# Patient Record
Sex: MALE | Race: WHITE | NOT HISPANIC OR LATINO | URBAN - METROPOLITAN AREA
[De-identification: names, ages, dates, MRNs, and addresses within clinical notes are randomized per-mention and may not be internally consistent; named-entity substitution may affect disease eponyms.]

---

## 2017-01-05 ENCOUNTER — EMERGENCY (EMERGENCY)
Facility: HOSPITAL | Age: 82
LOS: 1 days | Discharge: HOME | End: 2017-01-05
Admitting: EMERGENCY MEDICINE
Payer: MEDICARE

## 2017-01-05 DIAGNOSIS — I25.10 ATHEROSCLEROTIC HEART DISEASE OF NATIVE CORONARY ARTERY WITHOUT ANGINA PECTORIS: ICD-10-CM

## 2017-01-05 DIAGNOSIS — R53.1 WEAKNESS: ICD-10-CM

## 2017-01-05 DIAGNOSIS — F02.81 DEMENTIA IN OTHER DISEASES CLASSIFIED ELSEWHERE, UNSPECIFIED SEVERITY, WITH BEHAVIORAL DISTURBANCE: ICD-10-CM

## 2017-01-05 DIAGNOSIS — Z98.62 PERIPHERAL VASCULAR ANGIOPLASTY STATUS: ICD-10-CM

## 2017-01-05 DIAGNOSIS — Z79.82 LONG TERM (CURRENT) USE OF ASPIRIN: ICD-10-CM

## 2017-01-05 DIAGNOSIS — G30.9 ALZHEIMER'S DISEASE, UNSPECIFIED: ICD-10-CM

## 2017-01-05 DIAGNOSIS — I10 ESSENTIAL (PRIMARY) HYPERTENSION: ICD-10-CM

## 2017-01-05 PROCEDURE — 93005 ELECTROCARDIOGRAM TRACING: CPT

## 2017-01-05 PROCEDURE — 71045 X-RAY EXAM CHEST 1 VIEW: CPT

## 2017-01-05 PROCEDURE — 85027 COMPLETE CBC AUTOMATED: CPT

## 2017-01-05 PROCEDURE — 99284 EMERGENCY DEPT VISIT MOD MDM: CPT | Mod: 25

## 2017-01-05 PROCEDURE — 93010 ELECTROCARDIOGRAM REPORT: CPT

## 2017-01-05 PROCEDURE — 36415 COLL VENOUS BLD VENIPUNCTURE: CPT

## 2017-01-05 PROCEDURE — 84484 ASSAY OF TROPONIN QUANT: CPT

## 2017-01-05 PROCEDURE — 80048 BASIC METABOLIC PNL TOTAL CA: CPT

## 2017-01-05 PROCEDURE — 85610 PROTHROMBIN TIME: CPT

## 2017-01-05 PROCEDURE — 70450 CT HEAD/BRAIN W/O DYE: CPT

## 2017-01-05 PROCEDURE — 85730 THROMBOPLASTIN TIME PARTIAL: CPT

## 2017-01-05 PROCEDURE — 99285 EMERGENCY DEPT VISIT HI MDM: CPT

## 2017-01-05 PROCEDURE — 81003 URINALYSIS AUTO W/O SCOPE: CPT

## 2017-01-05 PROCEDURE — 87086 URINE CULTURE/COLONY COUNT: CPT

## 2017-01-05 PROCEDURE — 71010: CPT | Mod: 26

## 2017-01-05 PROCEDURE — 70450 CT HEAD/BRAIN W/O DYE: CPT | Mod: 26

## 2017-08-01 ENCOUNTER — EMERGENCY (EMERGENCY)
Facility: HOSPITAL | Age: 82
LOS: 1 days | Discharge: HOME | End: 2017-08-01
Admitting: INTERNAL MEDICINE
Payer: MEDICARE

## 2017-08-01 DIAGNOSIS — I25.10 ATHEROSCLEROTIC HEART DISEASE OF NATIVE CORONARY ARTERY WITHOUT ANGINA PECTORIS: ICD-10-CM
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
## 2017-08-01 DIAGNOSIS — Z98.890 OTHER SPECIFIED POSTPROCEDURAL STATES: ICD-10-CM

## 2017-08-01 DIAGNOSIS — Z79.82 LONG TERM (CURRENT) USE OF ASPIRIN: ICD-10-CM
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
## 2017-08-01 DIAGNOSIS — S09.90XA UNSPECIFIED INJURY OF HEAD, INITIAL ENCOUNTER: ICD-10-CM

## 2017-08-01 DIAGNOSIS — I10 ESSENTIAL (PRIMARY) HYPERTENSION: ICD-10-CM

## 2017-08-01 DIAGNOSIS — Z23 ENCOUNTER FOR IMMUNIZATION: ICD-10-CM

## 2017-08-01 DIAGNOSIS — S01.81XA LACERATION WITHOUT FOREIGN BODY OF OTHER PART OF HEAD, INITIAL ENCOUNTER: ICD-10-CM
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
## 2017-08-01 DIAGNOSIS — Z79.2 LONG TERM (CURRENT) USE OF ANTIBIOTICS: ICD-10-CM

## 2017-08-01 DIAGNOSIS — F03.90 UNSPECIFIED DEMENTIA WITHOUT BEHAVIORAL DISTURBANCE: ICD-10-CM

## 2017-08-01 PROCEDURE — 90700 DTAP VACCINE < 7 YRS IM: CPT

## 2017-08-01 PROCEDURE — 12013 RPR F/E/E/N/L/M 2.6-5.0 CM: CPT

## 2017-08-01 PROCEDURE — 70450 CT HEAD/BRAIN W/O DYE: CPT | Mod: 26

## 2017-08-01 PROCEDURE — 99284 EMERGENCY DEPT VISIT MOD MDM: CPT | Mod: 25

## 2017-08-01 PROCEDURE — 70450 CT HEAD/BRAIN W/O DYE: CPT

## 2017-08-01 PROCEDURE — 90471 IMMUNIZATION ADMIN: CPT

## 2017-09-14 ENCOUNTER — EMERGENCY (EMERGENCY)
Facility: HOSPITAL | Age: 82
LOS: 1 days | Discharge: NURSING, REHAB OR LONG-TERM CARE | End: 2017-09-14
Attending: INTERNAL MEDICINE | Admitting: INTERNAL MEDICINE
Payer: MEDICARE

## 2017-09-14 VITALS
DIASTOLIC BLOOD PRESSURE: 65 MMHG | RESPIRATION RATE: 16 BRPM | OXYGEN SATURATION: 99 % | SYSTOLIC BLOOD PRESSURE: 133 MMHG | HEART RATE: 89 BPM | TEMPERATURE: 98 F

## 2017-09-14 VITALS
HEIGHT: 67 IN | HEART RATE: 78 BPM | TEMPERATURE: 98 F | OXYGEN SATURATION: 96 % | DIASTOLIC BLOOD PRESSURE: 60 MMHG | SYSTOLIC BLOOD PRESSURE: 139 MMHG | WEIGHT: 154.98 LBS | RESPIRATION RATE: 20 BRPM

## 2017-09-14 LAB
ALBUMIN SERPL ELPH-MCNC: 3.2 G/DL — LOW (ref 3.3–5)
ALP SERPL-CCNC: 103 U/L — SIGNIFICANT CHANGE UP (ref 30–120)
ALT FLD-CCNC: 26 U/L DA — SIGNIFICANT CHANGE UP (ref 10–60)
ANION GAP SERPL CALC-SCNC: 11 MMOL/L — SIGNIFICANT CHANGE UP (ref 5–17)
APTT BLD: 31.6 SEC — SIGNIFICANT CHANGE UP (ref 27.5–37.4)
AST SERPL-CCNC: 21 U/L — SIGNIFICANT CHANGE UP (ref 10–40)
BASOPHILS # BLD AUTO: 0 K/UL — SIGNIFICANT CHANGE UP (ref 0–0.2)
BASOPHILS NFR BLD AUTO: 0.4 % — SIGNIFICANT CHANGE UP (ref 0–2)
BILIRUB SERPL-MCNC: 0.5 MG/DL — SIGNIFICANT CHANGE UP (ref 0.2–1.2)
BUN SERPL-MCNC: 30 MG/DL — HIGH (ref 7–23)
CALCIUM SERPL-MCNC: 8.8 MG/DL — SIGNIFICANT CHANGE UP (ref 8.4–10.5)
CHLORIDE SERPL-SCNC: 104 MMOL/L — SIGNIFICANT CHANGE UP (ref 96–108)
CK MB BLD-MCNC: 1.4 % — SIGNIFICANT CHANGE UP (ref 0–3.5)
CK MB CFR SERPL CALC: 2.7 NG/ML — SIGNIFICANT CHANGE UP (ref 0–3.6)
CK SERPL-CCNC: 191 U/L — SIGNIFICANT CHANGE UP (ref 39–308)
CO2 SERPL-SCNC: 28 MMOL/L — SIGNIFICANT CHANGE UP (ref 22–31)
CREAT SERPL-MCNC: 1.35 MG/DL — HIGH (ref 0.5–1.3)
EOSINOPHIL # BLD AUTO: 0.1 K/UL — SIGNIFICANT CHANGE UP (ref 0–0.5)
EOSINOPHIL NFR BLD AUTO: 0.6 % — SIGNIFICANT CHANGE UP (ref 0–6)
GLUCOSE SERPL-MCNC: 110 MG/DL — HIGH (ref 70–99)
HCT VFR BLD CALC: 32.3 % — LOW (ref 39–50)
HGB BLD-MCNC: 9.8 G/DL — LOW (ref 13–17)
INR BLD: 1.05 RATIO — SIGNIFICANT CHANGE UP (ref 0.88–1.16)
LYMPHOCYTES # BLD AUTO: 1.1 K/UL — SIGNIFICANT CHANGE UP (ref 1–3.3)
LYMPHOCYTES # BLD AUTO: 11.8 % — LOW (ref 13–44)
MCHC RBC-ENTMCNC: 27.8 PG — SIGNIFICANT CHANGE UP (ref 27–34)
MCHC RBC-ENTMCNC: 30.3 GM/DL — LOW (ref 32–36)
MCV RBC AUTO: 92 FL — SIGNIFICANT CHANGE UP (ref 80–100)
MONOCYTES # BLD AUTO: 0.9 K/UL — SIGNIFICANT CHANGE UP (ref 0–0.9)
MONOCYTES NFR BLD AUTO: 9.6 % — SIGNIFICANT CHANGE UP (ref 2–14)
NEUTROPHILS # BLD AUTO: 7.4 K/UL — SIGNIFICANT CHANGE UP (ref 1.8–7.4)
NEUTROPHILS NFR BLD AUTO: 77.5 % — HIGH (ref 43–77)
PLATELET # BLD AUTO: 214 K/UL — SIGNIFICANT CHANGE UP (ref 150–400)
POTASSIUM SERPL-MCNC: 3.9 MMOL/L — SIGNIFICANT CHANGE UP (ref 3.5–5.3)
POTASSIUM SERPL-SCNC: 3.9 MMOL/L — SIGNIFICANT CHANGE UP (ref 3.5–5.3)
PROT SERPL-MCNC: 7.1 G/DL — SIGNIFICANT CHANGE UP (ref 6–8.3)
PROTHROM AB SERPL-ACNC: 11.5 SEC — SIGNIFICANT CHANGE UP (ref 9.8–12.7)
RBC # BLD: 3.51 M/UL — LOW (ref 4.2–5.8)
RBC # FLD: 15.8 % — HIGH (ref 10.3–14.5)
SODIUM SERPL-SCNC: 143 MMOL/L — SIGNIFICANT CHANGE UP (ref 135–145)
TROPONIN I SERPL-MCNC: 0 NG/ML — LOW (ref 0.02–0.06)
WBC # BLD: 9.5 K/UL — SIGNIFICANT CHANGE UP (ref 3.8–10.5)
WBC # FLD AUTO: 9.5 K/UL — SIGNIFICANT CHANGE UP (ref 3.8–10.5)

## 2017-09-14 PROCEDURE — 74020: CPT

## 2017-09-14 PROCEDURE — 74020: CPT | Mod: 26

## 2017-09-14 PROCEDURE — 80053 COMPREHEN METABOLIC PANEL: CPT

## 2017-09-14 PROCEDURE — 84484 ASSAY OF TROPONIN QUANT: CPT

## 2017-09-14 PROCEDURE — 85027 COMPLETE CBC AUTOMATED: CPT

## 2017-09-14 PROCEDURE — 71045 X-RAY EXAM CHEST 1 VIEW: CPT

## 2017-09-14 PROCEDURE — 85610 PROTHROMBIN TIME: CPT

## 2017-09-14 PROCEDURE — 99284 EMERGENCY DEPT VISIT MOD MDM: CPT

## 2017-09-14 PROCEDURE — 82550 ASSAY OF CK (CPK): CPT

## 2017-09-14 PROCEDURE — 71010: CPT | Mod: 26

## 2017-09-14 PROCEDURE — 85730 THROMBOPLASTIN TIME PARTIAL: CPT

## 2017-09-14 PROCEDURE — 99284 EMERGENCY DEPT VISIT MOD MDM: CPT | Mod: 25

## 2017-09-14 PROCEDURE — 82553 CREATINE MB FRACTION: CPT

## 2017-09-14 PROCEDURE — 93010 ELECTROCARDIOGRAM REPORT: CPT

## 2017-09-14 PROCEDURE — 93005 ELECTROCARDIOGRAM TRACING: CPT

## 2017-09-14 NOTE — ED ADULT NURSE NOTE - OBJECTIVE STATEMENT
As per EMS patient was bradycardic on transport. Rhythm strip provided to ED MD reads HR 54 and 62. Medicated by [facility], RSR. According to transfer paper from facility patient has Right Ingular hernia that is strangulated. According to patient's aide that accompanies patient, patient was sent here because he chocked on food while eating. Dr Delacruz aware. CM in place RSR high 70's low 80's.

## 2017-09-14 NOTE — ED PROVIDER NOTE - SKIN, MLM
Skin normal color for race, warm, dry and intact. No evidence of rash. bruise overlying left lower anterior ribcage. stasis changes both lower extremities.

## 2017-09-14 NOTE — ED PROVIDER NOTE - ENMT, MLM
Dry mucous membranes. Airway patent, Throat has no vesicles, no oropharyngeal exudates and uvula is midline.

## 2017-09-14 NOTE — ED PROVIDER NOTE - PHYSICAL EXAMINATION
left subconjunctival hemorrhage. DMM. distended abdomen, but soft and non-tender. right inguinal hernia scar. bruise overlying left lower anterior ribcage. stasis changes both lower extremities. varicosities both lower extremities especially left medial thigh left subconjunctival hemorrhage.dry mucus memb,. distended abdomen, but soft and non-tender. right inguinal hernia scar but no incarc hernia. bruise overlying left lower anterior ribcage. stasis changes both lower extremities. varicosities both lower extremities, especially left medial thigh

## 2017-09-14 NOTE — ED PROVIDER NOTE - MEDICAL DECISION MAKING DETAILS
unclear at first as to why pt is in er...ambulance rxd pt for baradycardia with atropine but  ekg and monitor don't show this..nh note says aidee ward but he has none..aide says he choked on food...cxr without infilt...lungs clear...pt not coughing at all...ekg without bradycardia and trop nl.will rt nh.

## 2017-09-14 NOTE — ED PROVIDER NOTE - CARDIAC, MLM
Normal rate, regular rhythm.  Heart sounds S1, S2.  No murmurs, rubs or gallops. varicosities both lower extremities especially left medial thigh

## 2017-09-14 NOTE — ED ADULT NURSE REASSESSMENT NOTE - NS ED NURSE REASSESS COMMENT FT1
Patient picked up by [facility], caregiver at his side.
DC home awaiting [facility].
Patient awaiting lab results. As per aide the patient sneaked into the cafeteria and took food he is not supposed to eat and chocked to the pooint he turned blue. That is the reason he was sent to ER. VSS

## 2017-09-14 NOTE — ED PROVIDER NOTE - NS_ ATTENDINGSCRIBEDETAILS _ED_A_ED_FT
Saji Delacruz MD - The scribe's documentation has been prepared under my direction and personally reviewed by me in its entirety. I confirm that the note above accurately reflects all work, treatment, procedures, and medical decision making performed by me.

## 2017-09-14 NOTE — ED PROVIDER NOTE - OBJECTIVE STATEMENT
89 y/o M pt with history of CAD presents to the ED by ambulance with bradycardia and incarcerated hernia as per EMS. EMS gave him .5 Atropine x2 in the ambulance. Denies chest pain, shortness of breath, pain. No further complaints at this time. Pt is a poor historian. 89 y/o M from [facility] pt with history of CAD presents to the ED by ambulance with bradycardia rxd with atropine and incarcerated hernia as per nh note. pt Denies chest pain, shortness of breath, pain but there is h/o dementia No further complaints at this time.aide says pt is in er because he choked on food...(in er without either incarc hernia or bradycardia).

## 2017-09-21 ENCOUNTER — EMERGENCY (EMERGENCY)
Facility: HOSPITAL | Age: 82
LOS: 1 days | Discharge: HOME | End: 2017-09-21
Attending: EMERGENCY MEDICINE
Payer: MEDICARE

## 2017-09-21 VITALS
HEART RATE: 67 BPM | SYSTOLIC BLOOD PRESSURE: 133 MMHG | OXYGEN SATURATION: 96 % | TEMPERATURE: 98 F | HEIGHT: 67 IN | DIASTOLIC BLOOD PRESSURE: 60 MMHG | RESPIRATION RATE: 16 BRPM | WEIGHT: 154.98 LBS

## 2017-09-21 VITALS
HEART RATE: 55 BPM | TEMPERATURE: 98 F | RESPIRATION RATE: 16 BRPM | SYSTOLIC BLOOD PRESSURE: 160 MMHG | OXYGEN SATURATION: 99 % | DIASTOLIC BLOOD PRESSURE: 52 MMHG

## 2017-09-21 PROCEDURE — 99282 EMERGENCY DEPT VISIT SF MDM: CPT

## 2017-09-21 NOTE — ED PROVIDER NOTE - OBJECTIVE STATEMENT
Patient was walking at his residence, and a vein on the side of his foot "burst" and started bleeding, according to his aide. Has happened before. No injury known. No other c/o

## 2017-09-21 NOTE — ED ADULT NURSE NOTE - OBJECTIVE STATEMENT
Resident was eating dinner when staff at [facility] noticed bleeding from his left foot. Pt without any signs of pain, was ambulatory. No obvious injury at site, but pt has a varicosity which is source of bleeding. +1 edema bilaterally feet and ankles, +PP, skin is warm. Staff remains with pt.

## 2017-09-24 ENCOUNTER — EMERGENCY (EMERGENCY)
Facility: HOSPITAL | Age: 82
LOS: 1 days | Discharge: HOME | End: 2017-09-24
Admitting: INTERNAL MEDICINE
Payer: MEDICARE

## 2017-09-24 DIAGNOSIS — F03.90 UNSPECIFIED DEMENTIA WITHOUT BEHAVIORAL DISTURBANCE: ICD-10-CM

## 2017-09-24 DIAGNOSIS — I25.10 ATHEROSCLEROTIC HEART DISEASE OF NATIVE CORONARY ARTERY WITHOUT ANGINA PECTORIS: ICD-10-CM

## 2017-09-24 DIAGNOSIS — I83.892 VARICOSE VEINS OF LEFT LOWER EXTREMITY WITH OTHER COMPLICATIONS: ICD-10-CM

## 2017-09-24 DIAGNOSIS — Z78.9 OTHER SPECIFIED HEALTH STATUS: ICD-10-CM

## 2017-09-24 DIAGNOSIS — Z79.2 LONG TERM (CURRENT) USE OF ANTIBIOTICS: ICD-10-CM

## 2017-09-24 DIAGNOSIS — Z79.82 LONG TERM (CURRENT) USE OF ASPIRIN: ICD-10-CM

## 2017-09-24 DIAGNOSIS — I10 ESSENTIAL (PRIMARY) HYPERTENSION: ICD-10-CM

## 2017-09-24 DIAGNOSIS — Z98.62 PERIPHERAL VASCULAR ANGIOPLASTY STATUS: ICD-10-CM

## 2017-09-24 DIAGNOSIS — Z79.899 OTHER LONG TERM (CURRENT) DRUG THERAPY: ICD-10-CM

## 2017-09-24 PROCEDURE — 99283 EMERGENCY DEPT VISIT LOW MDM: CPT

## 2017-11-03 ENCOUNTER — EMERGENCY (EMERGENCY)
Facility: HOSPITAL | Age: 82
LOS: 1 days | Discharge: NURSING, REHAB OR LONG-TERM CARE | End: 2017-11-03
Attending: EMERGENCY MEDICINE | Admitting: EMERGENCY MEDICINE
Payer: MEDICARE

## 2017-11-03 VITALS
TEMPERATURE: 98 F | HEART RATE: 56 BPM | SYSTOLIC BLOOD PRESSURE: 138 MMHG | WEIGHT: 154.98 LBS | DIASTOLIC BLOOD PRESSURE: 46 MMHG | HEIGHT: 65 IN | RESPIRATION RATE: 18 BRPM | OXYGEN SATURATION: 100 %

## 2017-11-03 VITALS
DIASTOLIC BLOOD PRESSURE: 60 MMHG | OXYGEN SATURATION: 100 % | SYSTOLIC BLOOD PRESSURE: 164 MMHG | HEART RATE: 57 BPM | TEMPERATURE: 97 F | RESPIRATION RATE: 11 BRPM

## 2017-11-03 LAB
ALBUMIN SERPL ELPH-MCNC: 3.2 G/DL — LOW (ref 3.3–5)
ALP SERPL-CCNC: 107 U/L — SIGNIFICANT CHANGE UP (ref 30–120)
ALT FLD-CCNC: 23 U/L DA — SIGNIFICANT CHANGE UP (ref 10–60)
ANION GAP SERPL CALC-SCNC: 9 MMOL/L — SIGNIFICANT CHANGE UP (ref 5–17)
APTT BLD: 32.7 SEC — SIGNIFICANT CHANGE UP (ref 27.5–37.4)
AST SERPL-CCNC: 18 U/L — SIGNIFICANT CHANGE UP (ref 10–40)
BASOPHILS # BLD AUTO: 0 K/UL — SIGNIFICANT CHANGE UP (ref 0–0.2)
BASOPHILS NFR BLD AUTO: 0.5 % — SIGNIFICANT CHANGE UP (ref 0–2)
BILIRUB SERPL-MCNC: 0.5 MG/DL — SIGNIFICANT CHANGE UP (ref 0.2–1.2)
BUN SERPL-MCNC: 32 MG/DL — HIGH (ref 7–23)
CALCIUM SERPL-MCNC: 8.5 MG/DL — SIGNIFICANT CHANGE UP (ref 8.4–10.5)
CHLORIDE SERPL-SCNC: 104 MMOL/L — SIGNIFICANT CHANGE UP (ref 96–108)
CK MB BLD-MCNC: 2.1 % — SIGNIFICANT CHANGE UP (ref 0–3.5)
CK MB CFR SERPL CALC: 2.5 NG/ML — SIGNIFICANT CHANGE UP (ref 0–3.6)
CK SERPL-CCNC: 117 U/L — SIGNIFICANT CHANGE UP (ref 39–308)
CO2 SERPL-SCNC: 27 MMOL/L — SIGNIFICANT CHANGE UP (ref 22–31)
CREAT SERPL-MCNC: 1.39 MG/DL — HIGH (ref 0.5–1.3)
EOSINOPHIL # BLD AUTO: 0 K/UL — SIGNIFICANT CHANGE UP (ref 0–0.5)
EOSINOPHIL NFR BLD AUTO: 0.1 % — SIGNIFICANT CHANGE UP (ref 0–6)
GLUCOSE SERPL-MCNC: 87 MG/DL — SIGNIFICANT CHANGE UP (ref 70–99)
HCT VFR BLD CALC: 29.8 % — LOW (ref 39–50)
HGB BLD-MCNC: 8.9 G/DL — LOW (ref 13–17)
INR BLD: 1.1 RATIO — SIGNIFICANT CHANGE UP (ref 0.88–1.16)
LYMPHOCYTES # BLD AUTO: 1.1 K/UL — SIGNIFICANT CHANGE UP (ref 1–3.3)
LYMPHOCYTES # BLD AUTO: 11.4 % — LOW (ref 13–44)
MCHC RBC-ENTMCNC: 27.3 PG — SIGNIFICANT CHANGE UP (ref 27–34)
MCHC RBC-ENTMCNC: 29.8 GM/DL — LOW (ref 32–36)
MCV RBC AUTO: 91.6 FL — SIGNIFICANT CHANGE UP (ref 80–100)
MONOCYTES # BLD AUTO: 0.8 K/UL — SIGNIFICANT CHANGE UP (ref 0–0.9)
MONOCYTES NFR BLD AUTO: 8.7 % — SIGNIFICANT CHANGE UP (ref 2–14)
NEUTROPHILS # BLD AUTO: 7.6 K/UL — HIGH (ref 1.8–7.4)
NEUTROPHILS NFR BLD AUTO: 79.3 % — HIGH (ref 43–77)
PLATELET # BLD AUTO: 238 K/UL — SIGNIFICANT CHANGE UP (ref 150–400)
POTASSIUM SERPL-MCNC: 4 MMOL/L — SIGNIFICANT CHANGE UP (ref 3.5–5.3)
POTASSIUM SERPL-SCNC: 4 MMOL/L — SIGNIFICANT CHANGE UP (ref 3.5–5.3)
PROT SERPL-MCNC: 7.1 G/DL — SIGNIFICANT CHANGE UP (ref 6–8.3)
PROTHROM AB SERPL-ACNC: 12 SEC — SIGNIFICANT CHANGE UP (ref 9.8–12.7)
RBC # BLD: 3.25 M/UL — LOW (ref 4.2–5.8)
RBC # FLD: 16.8 % — HIGH (ref 10.3–14.5)
SODIUM SERPL-SCNC: 140 MMOL/L — SIGNIFICANT CHANGE UP (ref 135–145)
TROPONIN I SERPL-MCNC: 0 NG/ML — LOW (ref 0.02–0.06)
WBC # BLD: 9.5 K/UL — SIGNIFICANT CHANGE UP (ref 3.8–10.5)
WBC # FLD AUTO: 9.5 K/UL — SIGNIFICANT CHANGE UP (ref 3.8–10.5)

## 2017-11-03 PROCEDURE — 85610 PROTHROMBIN TIME: CPT

## 2017-11-03 PROCEDURE — 85027 COMPLETE CBC AUTOMATED: CPT

## 2017-11-03 PROCEDURE — 93010 ELECTROCARDIOGRAM REPORT: CPT

## 2017-11-03 PROCEDURE — 71010: CPT | Mod: 26

## 2017-11-03 PROCEDURE — 82550 ASSAY OF CK (CPK): CPT

## 2017-11-03 PROCEDURE — 93005 ELECTROCARDIOGRAM TRACING: CPT

## 2017-11-03 PROCEDURE — 85730 THROMBOPLASTIN TIME PARTIAL: CPT

## 2017-11-03 PROCEDURE — 84484 ASSAY OF TROPONIN QUANT: CPT

## 2017-11-03 PROCEDURE — 70450 CT HEAD/BRAIN W/O DYE: CPT | Mod: 26

## 2017-11-03 PROCEDURE — 71045 X-RAY EXAM CHEST 1 VIEW: CPT

## 2017-11-03 PROCEDURE — 80053 COMPREHEN METABOLIC PANEL: CPT

## 2017-11-03 PROCEDURE — 82553 CREATINE MB FRACTION: CPT

## 2017-11-03 PROCEDURE — 99284 EMERGENCY DEPT VISIT MOD MDM: CPT

## 2017-11-03 PROCEDURE — 70450 CT HEAD/BRAIN W/O DYE: CPT

## 2017-11-03 PROCEDURE — 99284 EMERGENCY DEPT VISIT MOD MDM: CPT | Mod: 25

## 2017-11-03 RX ORDER — MUPIROCIN 20 MG/G
0 OINTMENT TOPICAL
Qty: 0 | Refills: 0 | COMMUNITY

## 2017-11-03 RX ORDER — SODIUM CHLORIDE 9 MG/ML
3 INJECTION INTRAMUSCULAR; INTRAVENOUS; SUBCUTANEOUS ONCE
Qty: 0 | Refills: 0 | Status: DISCONTINUED | OUTPATIENT
Start: 2017-11-03 | End: 2017-11-11

## 2017-11-03 NOTE — ED PROVIDER NOTE - OBJECTIVE STATEMENT
89 y/o M w/ PMHx CAD, depression, dizziness, HTN, HLD, dementia presents to the ED from [facility] s/p syncopal episode, according to [facility] paperwork. Pt is on Lasix. Pt's hx is limited due to dementia.

## 2017-11-03 NOTE — ED ADULT NURSE NOTE - PMH
CAD (coronary artery disease)    Dementia    Depression    Dizziness    Hyperlipidemia    Hypertension

## 2018-03-28 ENCOUNTER — EMERGENCY (EMERGENCY)
Facility: HOSPITAL | Age: 83
LOS: 1 days | Discharge: HOME | End: 2018-03-28
Admitting: EMERGENCY MEDICINE
Payer: MEDICARE

## 2018-03-28 DIAGNOSIS — I83.892 VARICOSE VEINS OF LEFT LOWER EXTREMITY WITH OTHER COMPLICATIONS: ICD-10-CM

## 2018-03-28 DIAGNOSIS — Z98.62 PERIPHERAL VASCULAR ANGIOPLASTY STATUS: ICD-10-CM

## 2018-03-28 DIAGNOSIS — Z79.82 LONG TERM (CURRENT) USE OF ASPIRIN: ICD-10-CM

## 2018-03-28 DIAGNOSIS — R41.0 DISORIENTATION, UNSPECIFIED: ICD-10-CM

## 2018-03-28 DIAGNOSIS — I10 ESSENTIAL (PRIMARY) HYPERTENSION: ICD-10-CM

## 2018-03-28 DIAGNOSIS — F03.90 UNSPECIFIED DEMENTIA WITHOUT BEHAVIORAL DISTURBANCE: ICD-10-CM

## 2018-03-28 DIAGNOSIS — Z87.19 PERSONAL HISTORY OF OTHER DISEASES OF THE DIGESTIVE SYSTEM: ICD-10-CM

## 2018-03-28 DIAGNOSIS — I25.10 ATHEROSCLEROTIC HEART DISEASE OF NATIVE CORONARY ARTERY WITHOUT ANGINA PECTORIS: ICD-10-CM

## 2018-03-28 DIAGNOSIS — Z79.899 OTHER LONG TERM (CURRENT) DRUG THERAPY: ICD-10-CM

## 2018-03-28 PROCEDURE — 99283 EMERGENCY DEPT VISIT LOW MDM: CPT

## 2018-03-29 PROCEDURE — 99283 EMERGENCY DEPT VISIT LOW MDM: CPT

## 2018-05-23 ENCOUNTER — EMERGENCY (EMERGENCY)
Facility: HOSPITAL | Age: 83
LOS: 1 days | Discharge: HOME | End: 2018-05-23
Admitting: EMERGENCY MEDICINE
Payer: MEDICARE

## 2018-05-23 DIAGNOSIS — Z79.2 LONG TERM (CURRENT) USE OF ANTIBIOTICS: ICD-10-CM

## 2018-05-23 DIAGNOSIS — Z87.19 PERSONAL HISTORY OF OTHER DISEASES OF THE DIGESTIVE SYSTEM: ICD-10-CM

## 2018-05-23 DIAGNOSIS — Z98.62 PERIPHERAL VASCULAR ANGIOPLASTY STATUS: ICD-10-CM

## 2018-05-23 DIAGNOSIS — R09.89 OTHER SPECIFIED SYMPTOMS AND SIGNS INVOLVING THE CIRCULATORY AND RESPIRATORY SYSTEMS: ICD-10-CM

## 2018-05-23 DIAGNOSIS — R53.83 OTHER FATIGUE: ICD-10-CM

## 2018-05-23 DIAGNOSIS — Z79.899 OTHER LONG TERM (CURRENT) DRUG THERAPY: ICD-10-CM

## 2018-05-23 DIAGNOSIS — R11.10 VOMITING, UNSPECIFIED: ICD-10-CM

## 2018-05-23 DIAGNOSIS — F03.90 UNSPECIFIED DEMENTIA WITHOUT BEHAVIORAL DISTURBANCE: ICD-10-CM

## 2018-05-23 DIAGNOSIS — I25.10 ATHEROSCLEROTIC HEART DISEASE OF NATIVE CORONARY ARTERY WITHOUT ANGINA PECTORIS: ICD-10-CM

## 2018-05-23 DIAGNOSIS — Z79.82 LONG TERM (CURRENT) USE OF ASPIRIN: ICD-10-CM

## 2018-05-23 DIAGNOSIS — R55 SYNCOPE AND COLLAPSE: ICD-10-CM

## 2018-05-23 PROCEDURE — 99284 EMERGENCY DEPT VISIT MOD MDM: CPT | Mod: 25

## 2018-05-23 PROCEDURE — 82553 CREATINE MB FRACTION: CPT

## 2018-05-23 PROCEDURE — 82550 ASSAY OF CK (CPK): CPT

## 2018-05-23 PROCEDURE — 81003 URINALYSIS AUTO W/O SCOPE: CPT

## 2018-05-23 PROCEDURE — 85610 PROTHROMBIN TIME: CPT

## 2018-05-23 PROCEDURE — 84484 ASSAY OF TROPONIN QUANT: CPT

## 2018-05-23 PROCEDURE — 93010 ELECTROCARDIOGRAM REPORT: CPT

## 2018-05-23 PROCEDURE — 71045 X-RAY EXAM CHEST 1 VIEW: CPT | Mod: 26

## 2018-05-23 PROCEDURE — 96374 THER/PROPH/DIAG INJ IV PUSH: CPT

## 2018-05-23 PROCEDURE — 70450 CT HEAD/BRAIN W/O DYE: CPT

## 2018-05-23 PROCEDURE — 93005 ELECTROCARDIOGRAM TRACING: CPT

## 2018-05-23 PROCEDURE — 99285 EMERGENCY DEPT VISIT HI MDM: CPT

## 2018-05-23 PROCEDURE — 71045 X-RAY EXAM CHEST 1 VIEW: CPT

## 2018-05-23 PROCEDURE — 70450 CT HEAD/BRAIN W/O DYE: CPT | Mod: 26

## 2018-05-23 PROCEDURE — 85027 COMPLETE CBC AUTOMATED: CPT

## 2018-05-23 PROCEDURE — 85730 THROMBOPLASTIN TIME PARTIAL: CPT

## 2018-05-23 PROCEDURE — 80053 COMPREHEN METABOLIC PANEL: CPT

## 2018-06-25 ENCOUNTER — EMERGENCY (EMERGENCY)
Facility: HOSPITAL | Age: 83
LOS: 1 days | Discharge: HOME | End: 2018-06-25
Attending: EMERGENCY MEDICINE | Admitting: EMERGENCY MEDICINE
Payer: MEDICARE

## 2018-06-25 VITALS
HEART RATE: 62 BPM | OXYGEN SATURATION: 95 % | WEIGHT: 119.93 LBS | TEMPERATURE: 97 F | DIASTOLIC BLOOD PRESSURE: 66 MMHG | RESPIRATION RATE: 16 BRPM | SYSTOLIC BLOOD PRESSURE: 125 MMHG

## 2018-06-25 DIAGNOSIS — R58 HEMORRHAGE, NOT ELSEWHERE CLASSIFIED: ICD-10-CM

## 2018-06-25 PROCEDURE — 99283 EMERGENCY DEPT VISIT LOW MDM: CPT

## 2018-06-25 PROCEDURE — 99282 EMERGENCY DEPT VISIT SF MDM: CPT

## 2018-06-25 NOTE — ED ADULT NURSE NOTE - OBJECTIVE STATEMENT
Pt presents with gauze dressing on the left anterior foot. There is small amount of bleeding on dressing and very slight oozing from puncture on left foot. Pt is not oriented at baseline. Pt denies pain and does not grimace when moving extremities or palpation foot, head or neck. Pedal pulse is strong and present bilaterally. EMS denies report of pt falling. Pt not found on floor. Per EMS pt found sitting in chair.

## 2018-06-25 NOTE — ED PROVIDER NOTE - SKIN WOUND TYPE
L foot c varicosities. punctate site of bleeding identified anterior /inferior to L lateral malleolus over a varicosity. nontender. initially no active bleeding. after some irrigation c NS and manipulation, small amt bleeding

## 2018-06-25 NOTE — ED PROVIDER NOTE - OBJECTIVE STATEMENT
pt sent from assisted living for bleeding, moderate from left foot. no known injury. pt with dementia. unable to give history. denies any pain.

## 2018-06-25 NOTE — ED PROVIDER NOTE - MUSCULOSKELETAL, MLM
Spine appears normal, range of motion is not limited, no muscle or joint tenderness.  L foot/ankle nontender, FROM. 2+ DP pulse

## 2018-06-25 NOTE — ED ADULT TRIAGE NOTE - CHIEF COMPLAINT QUOTE
Pt RUDDY EMS from [facility] c/o left foot lac s/p scrapping foot on wooden bed post sometime before 0500 this AM.

## 2018-11-14 ENCOUNTER — INPATIENT (INPATIENT)
Facility: HOSPITAL | Age: 83
LOS: 11 days | Discharge: NURSING, REHAB OR LONG-TERM CARE | DRG: 690 | End: 2018-11-26
Attending: INTERNAL MEDICINE | Admitting: INTERNAL MEDICINE
Payer: MEDICARE

## 2018-11-14 VITALS
OXYGEN SATURATION: 98 % | HEIGHT: 68 IN | RESPIRATION RATE: 20 BRPM | HEART RATE: 64 BPM | TEMPERATURE: 98 F | SYSTOLIC BLOOD PRESSURE: 165 MMHG | WEIGHT: 169.98 LBS | DIASTOLIC BLOOD PRESSURE: 55 MMHG

## 2018-11-14 DIAGNOSIS — N39.0 URINARY TRACT INFECTION, SITE NOT SPECIFIED: ICD-10-CM

## 2018-11-14 DIAGNOSIS — R41.82 ALTERED MENTAL STATUS, UNSPECIFIED: ICD-10-CM

## 2018-11-14 DIAGNOSIS — R55 SYNCOPE AND COLLAPSE: ICD-10-CM

## 2018-11-14 DIAGNOSIS — G30.9 ALZHEIMER'S DISEASE, UNSPECIFIED: ICD-10-CM

## 2018-11-14 DIAGNOSIS — Z29.9 ENCOUNTER FOR PROPHYLACTIC MEASURES, UNSPECIFIED: ICD-10-CM

## 2018-11-14 DIAGNOSIS — I25.10 ATHEROSCLEROTIC HEART DISEASE OF NATIVE CORONARY ARTERY WITHOUT ANGINA PECTORIS: ICD-10-CM

## 2018-11-14 PROBLEM — F03.90 UNSPECIFIED DEMENTIA WITHOUT BEHAVIORAL DISTURBANCE: Chronic | Status: ACTIVE | Noted: 2017-11-03

## 2018-11-14 LAB
ALBUMIN SERPL ELPH-MCNC: 3.4 G/DL — SIGNIFICANT CHANGE UP (ref 3.3–5)
ALP SERPL-CCNC: 88 U/L — SIGNIFICANT CHANGE UP (ref 30–120)
ALT FLD-CCNC: 24 U/L DA — SIGNIFICANT CHANGE UP (ref 10–60)
ANION GAP SERPL CALC-SCNC: 10 MMOL/L — SIGNIFICANT CHANGE UP (ref 5–17)
APPEARANCE UR: CLEAR — SIGNIFICANT CHANGE UP
APTT BLD: 31.7 SEC — SIGNIFICANT CHANGE UP (ref 28.5–37)
AST SERPL-CCNC: 23 U/L — SIGNIFICANT CHANGE UP (ref 10–40)
BASOPHILS # BLD AUTO: 0.02 K/UL — SIGNIFICANT CHANGE UP (ref 0–0.2)
BASOPHILS NFR BLD AUTO: 0.2 % — SIGNIFICANT CHANGE UP (ref 0–2)
BILIRUB SERPL-MCNC: 0.4 MG/DL — SIGNIFICANT CHANGE UP (ref 0.2–1.2)
BILIRUB UR-MCNC: NEGATIVE — SIGNIFICANT CHANGE UP
BUN SERPL-MCNC: 35 MG/DL — HIGH (ref 7–23)
CALCIUM SERPL-MCNC: 9.3 MG/DL — SIGNIFICANT CHANGE UP (ref 8.4–10.5)
CHLORIDE SERPL-SCNC: 102 MMOL/L — SIGNIFICANT CHANGE UP (ref 96–108)
CO2 SERPL-SCNC: 27 MMOL/L — SIGNIFICANT CHANGE UP (ref 22–31)
COLOR SPEC: YELLOW — SIGNIFICANT CHANGE UP
CREAT SERPL-MCNC: 1.45 MG/DL — HIGH (ref 0.5–1.3)
DIFF PNL FLD: ABNORMAL
EOSINOPHIL # BLD AUTO: 0.03 K/UL — SIGNIFICANT CHANGE UP (ref 0–0.5)
EOSINOPHIL NFR BLD AUTO: 0.3 % — SIGNIFICANT CHANGE UP (ref 0–6)
GLUCOSE SERPL-MCNC: 92 MG/DL — SIGNIFICANT CHANGE UP (ref 70–99)
GLUCOSE UR QL: NEGATIVE MG/DL — SIGNIFICANT CHANGE UP
HCT VFR BLD CALC: 36.3 % — LOW (ref 39–50)
HGB BLD-MCNC: 12 G/DL — LOW (ref 13–17)
IMM GRANULOCYTES NFR BLD AUTO: 0.4 % — SIGNIFICANT CHANGE UP (ref 0–1.5)
INR BLD: 1.08 RATIO — SIGNIFICANT CHANGE UP (ref 0.88–1.16)
KETONES UR-MCNC: NEGATIVE — SIGNIFICANT CHANGE UP
LACTATE SERPL-SCNC: 1.7 MMOL/L — SIGNIFICANT CHANGE UP (ref 0.7–2)
LACTATE SERPL-SCNC: 2 MMOL/L — SIGNIFICANT CHANGE UP (ref 0.7–2)
LACTATE SERPL-SCNC: 2.1 MMOL/L — HIGH (ref 0.7–2)
LEUKOCYTE ESTERASE UR-ACNC: ABNORMAL
LYMPHOCYTES # BLD AUTO: 0.88 K/UL — LOW (ref 1–3.3)
LYMPHOCYTES # BLD AUTO: 8.3 % — LOW (ref 13–44)
MCHC RBC-ENTMCNC: 33.1 GM/DL — SIGNIFICANT CHANGE UP (ref 32–36)
MCHC RBC-ENTMCNC: 33.5 PG — SIGNIFICANT CHANGE UP (ref 27–34)
MCV RBC AUTO: 101.4 FL — HIGH (ref 80–100)
MONOCYTES # BLD AUTO: 0.7 K/UL — SIGNIFICANT CHANGE UP (ref 0–0.9)
MONOCYTES NFR BLD AUTO: 6.6 % — SIGNIFICANT CHANGE UP (ref 2–14)
NEUTROPHILS # BLD AUTO: 8.88 K/UL — HIGH (ref 1.8–7.4)
NEUTROPHILS NFR BLD AUTO: 84.2 % — HIGH (ref 43–77)
NITRITE UR-MCNC: NEGATIVE — SIGNIFICANT CHANGE UP
NT-PROBNP SERPL-SCNC: 1367 PG/ML — HIGH (ref 0–450)
PH UR: 7 — SIGNIFICANT CHANGE UP (ref 5–8)
PLATELET # BLD AUTO: 194 K/UL — SIGNIFICANT CHANGE UP (ref 150–400)
POTASSIUM SERPL-MCNC: 4.3 MMOL/L — SIGNIFICANT CHANGE UP (ref 3.5–5.3)
POTASSIUM SERPL-SCNC: 4.3 MMOL/L — SIGNIFICANT CHANGE UP (ref 3.5–5.3)
PROT SERPL-MCNC: 7 G/DL — SIGNIFICANT CHANGE UP (ref 6–8.3)
PROT UR-MCNC: NEGATIVE MG/DL — SIGNIFICANT CHANGE UP
PROTHROM AB SERPL-ACNC: 11.8 SEC — SIGNIFICANT CHANGE UP (ref 10–12.9)
RBC # BLD: 3.58 M/UL — LOW (ref 4.2–5.8)
RBC # FLD: 13.6 % — SIGNIFICANT CHANGE UP (ref 10.3–14.5)
SODIUM SERPL-SCNC: 139 MMOL/L — SIGNIFICANT CHANGE UP (ref 135–145)
SP GR SPEC: 1.01 — SIGNIFICANT CHANGE UP (ref 1.01–1.02)
TROPONIN I SERPL-MCNC: 0 NG/ML — LOW (ref 0.02–0.06)
UROBILINOGEN FLD QL: NEGATIVE MG/DL — SIGNIFICANT CHANGE UP
WBC # BLD: 10.55 K/UL — HIGH (ref 3.8–10.5)
WBC # FLD AUTO: 10.55 K/UL — HIGH (ref 3.8–10.5)

## 2018-11-14 PROCEDURE — 93010 ELECTROCARDIOGRAM REPORT: CPT

## 2018-11-14 PROCEDURE — 99285 EMERGENCY DEPT VISIT HI MDM: CPT

## 2018-11-14 PROCEDURE — 93306 TTE W/DOPPLER COMPLETE: CPT | Mod: 26

## 2018-11-14 PROCEDURE — 71045 X-RAY EXAM CHEST 1 VIEW: CPT | Mod: 26

## 2018-11-14 PROCEDURE — 70450 CT HEAD/BRAIN W/O DYE: CPT | Mod: 26

## 2018-11-14 RX ORDER — CEFTRIAXONE 500 MG/1
1 INJECTION, POWDER, FOR SOLUTION INTRAMUSCULAR; INTRAVENOUS EVERY 24 HOURS
Qty: 0 | Refills: 0 | Status: COMPLETED | OUTPATIENT
Start: 2018-11-15 | End: 2018-11-17

## 2018-11-14 RX ORDER — LACTOBACILLUS ACIDOPHILUS 100MM CELL
1 CAPSULE ORAL
Qty: 0 | Refills: 0 | Status: DISCONTINUED | OUTPATIENT
Start: 2018-11-14 | End: 2018-11-26

## 2018-11-14 RX ORDER — PANTOPRAZOLE SODIUM 20 MG/1
40 TABLET, DELAYED RELEASE ORAL
Qty: 0 | Refills: 0 | Status: DISCONTINUED | OUTPATIENT
Start: 2018-11-14 | End: 2018-11-26

## 2018-11-14 RX ORDER — DOCUSATE SODIUM 100 MG
100 CAPSULE ORAL DAILY
Qty: 0 | Refills: 0 | Status: DISCONTINUED | OUTPATIENT
Start: 2018-11-14 | End: 2018-11-26

## 2018-11-14 RX ORDER — CEFTRIAXONE 500 MG/1
1 INJECTION, POWDER, FOR SOLUTION INTRAMUSCULAR; INTRAVENOUS ONCE
Qty: 0 | Refills: 0 | Status: COMPLETED | OUTPATIENT
Start: 2018-11-14 | End: 2018-11-14

## 2018-11-14 RX ORDER — SODIUM CHLORIDE 9 MG/ML
1000 INJECTION INTRAMUSCULAR; INTRAVENOUS; SUBCUTANEOUS ONCE
Qty: 0 | Refills: 0 | Status: COMPLETED | OUTPATIENT
Start: 2018-11-14 | End: 2018-11-14

## 2018-11-14 RX ORDER — LOSARTAN POTASSIUM 100 MG/1
25 TABLET, FILM COATED ORAL DAILY
Qty: 0 | Refills: 0 | Status: DISCONTINUED | OUTPATIENT
Start: 2018-11-14 | End: 2018-11-17

## 2018-11-14 RX ORDER — ASPIRIN/CALCIUM CARB/MAGNESIUM 324 MG
81 TABLET ORAL DAILY
Qty: 0 | Refills: 0 | Status: DISCONTINUED | OUTPATIENT
Start: 2018-11-14 | End: 2018-11-26

## 2018-11-14 RX ORDER — RISPERIDONE 4 MG/1
0.5 TABLET ORAL THREE TIMES A DAY
Qty: 0 | Refills: 0 | Status: DISCONTINUED | OUTPATIENT
Start: 2018-11-14 | End: 2018-11-15

## 2018-11-14 RX ORDER — FERROUS SULFATE 325(65) MG
325 TABLET ORAL DAILY
Qty: 0 | Refills: 0 | Status: DISCONTINUED | OUTPATIENT
Start: 2018-11-14 | End: 2018-11-26

## 2018-11-14 RX ORDER — ENOXAPARIN SODIUM 100 MG/ML
30 INJECTION SUBCUTANEOUS DAILY
Qty: 0 | Refills: 0 | Status: DISCONTINUED | OUTPATIENT
Start: 2018-11-14 | End: 2018-11-26

## 2018-11-14 RX ADMIN — RISPERIDONE 0.5 MILLIGRAM(S): 4 TABLET ORAL at 22:55

## 2018-11-14 RX ADMIN — CEFTRIAXONE 100 GRAM(S): 500 INJECTION, POWDER, FOR SOLUTION INTRAMUSCULAR; INTRAVENOUS at 16:15

## 2018-11-14 RX ADMIN — SODIUM CHLORIDE 500 MILLILITER(S): 9 INJECTION INTRAMUSCULAR; INTRAVENOUS; SUBCUTANEOUS at 14:00

## 2018-11-14 RX ADMIN — SODIUM CHLORIDE 1000 MILLILITER(S): 9 INJECTION INTRAMUSCULAR; INTRAVENOUS; SUBCUTANEOUS at 16:00

## 2018-11-14 RX ADMIN — CEFTRIAXONE 1 GRAM(S): 500 INJECTION, POWDER, FOR SOLUTION INTRAMUSCULAR; INTRAVENOUS at 17:00

## 2018-11-14 NOTE — ED PROVIDER NOTE - ATTENDING CONTRIBUTION TO CARE
I performed a history and physical exam of the patient and discussed their management with the advanced care provider. I reviewed the advanced care provider's note and agree with the documented findings and plan of care. My medical decision making and objective findings are found above.
I performed a history and physical exam of the patient and discussed their management with the advanced care provider. I reviewed the advanced care provider's note and agree with the documented findings and plan of care. My medical decision making and objective findings are found above.

## 2018-11-14 NOTE — ED PROVIDER NOTE - MEDICAL DECISION MAKING DETAILS
89 y/o M hx of dementia, cerebral infarct, HTN, HLD sent from [facility] for eval s/p unresponsiveness today, responsive and awake in ED, no fall/trauma/fever, will get ct head, ekg, cxr, labs, ua, re-assess
89 y/o M with hx of dementia, CAD sent from [facility] for altered mental status, pt became unresponsive for few mins this am in the facility, afebrile, VSS, pt arousable to verbal stimuli but slow to response, non focal exam, will get ct head, ekg, cxr, labs, ua, cultures, admit

## 2018-11-14 NOTE — ED ADULT NURSE NOTE - OBJECTIVE STATEMENT
Pt BIBA from the [facility]  for episode of unresponsiveness. As per nursing facility aide, Pt reported was found on a chair unresponsive and looked pale. Pt is awake but drowsy and lethargic. Pt minimally responsive verbally. Pt identified by the [facility] Nursing Supervisor Fercho Sher. No signs of pain or discomfort noted.

## 2018-11-14 NOTE — ED PROVIDER NOTE - OBJECTIVE STATEMENT
biba from [facility] assisted living for evaluation s/p episode of unresponsiveness this morning. As per aide from facility at bedside, pt was walking around this morning and ate his breakfast and then was sitting on the chair when he became unresponsive for ?few minutes, laid pt down on bed and oxygen given. Denies fall/head trauma. Pt awake and responsive at this time.     	PMD: Dr. Reyes 89 y/o M with hx of dementia, CAD, GERD, anemia, HLD, HTN, depression biba from [facility] assisted living for evaluation s/p episode of unresponsiveness this morning. As per aide from facility at bedside, pt was walking around this morning and ate his breakfast and then was sitting on the chair when he became unresponsive for ?few minutes, laid pt down on bed and oxygen given. Denies fall/head trauma. Pt awake and responsive at this time.   PMD: Dr. Reyes 89 y/o M with hx of dementia, CAD, GERD, anemia, HLD, HTN, depression biba from [facility] assisted living for evaluation s/p episode of unresponsiveness this morning. As per aide from facility at bedside, pt was walking around this morning and ate his breakfast and then was sitting on the chair around 11am when he became unresponsive for ?few minutes, laid pt down on bed and oxygen given. Denies fall/head trauma. Pt unable to provide any history but denies pain. As per aide, altered mental status since becoming unresponsive. Responsive to verbal stimuli in ED.  PMD: Dr. Reyes

## 2018-11-14 NOTE — CONSULT NOTE ADULT - SUBJECTIVE AND OBJECTIVE BOX
For episode of loss of consciousness, suspect most likely this was syncopal. There is no clear signs on examination to suggest a new cerebrovascular accident has ensued and there is no history to suggest underlying epilepsy.    tele eval   monitor sbp     continue home medications     called family no response

## 2018-11-14 NOTE — ED ADULT NURSE NOTE - CHIEF COMPLAINT
The patient is a 89y Male complaining of unresponsive.
The patient is a 89y Male complaining of unresponsive.

## 2018-11-14 NOTE — ED PROVIDER NOTE - ATTESTATION, MLM
I have reviewed and confirmed nurses' notes for patient's medications, allergies, medical history, and surgical history.
I have reviewed and confirmed nurses' notes for patient's medications, allergies, medical history, and surgical history.

## 2018-11-14 NOTE — H&P ADULT - COMMENTS
unable as pt is confused only able to tell his name. does not respond to any other questio, though alert at present and is being fed by caregiver and is swallowing well

## 2018-11-14 NOTE — H&P ADULT - HISTORY OF PRESENT ILLNESS
89 y/o M with hx of dementia, CAD, GERD, anemia, HLD, HTN, depression, aspiration PNA, biba from [facility] assisted living for evaluation s/p episode of unresponsiveness this morning. As per aide from facility at bedside, pt was walking around this morning and ate his breakfast and then was sitting on the chair around 11am when he became unresponsive for ?few minutes, laid pt down on bed and oxygen given. Denies fall/head trauma. Pt unable to provide any history but denies pain. As per aide, altered mental status since becoming unresponsive. Responsive to verbal stimuli in ED.  afebrile in ED bp 165/55HR 64,  being admitted for AMS with h/o Cad HTn, Dementia , with syncope possible vasovagal, with possible UTi, cultures sent and started on ceftriaxone in ED.

## 2018-11-14 NOTE — ED PROVIDER NOTE - PROGRESS NOTE DETAILS
Attending Note: 89 y/o M pt w/ PMHx CVA, dementia BIBA for unresponsiveness today. Pt denies pain but cannot provide hx. Vital signs stable, afebrile. Generalized weakness but non-focal exam. Plan - CT brain, labs, UA. r/o infection vs. neurologic event

## 2018-11-14 NOTE — ED PROVIDER NOTE - NEUROLOGICAL, MLM
awake, responsive to verbal stimuli, pleasantly demented
responsive to verbal stimuli, nonfocal exam

## 2018-11-14 NOTE — H&P ADULT - MENTAL STATUS
"Discharge Note    Progress reporting period is from initial eval to Aug 7, 2017.     Jim failed to return for next follow up visit and current status is unknown.  Please see information below for last relevant information on current status.  Patient seen for 4 visits.  SUBJECTIVE  Subjective changes noted by patient:  Pt reports the back is doing better today, feeling pretty good. Pt states the leg has had no numbness and tingling since the last session. Pt feels he is near baseline for him at this time. Reports performing EIL lock blow sag 2-3x/day yet.   .  Current pain level is 2/10.     Previous pain level was  4/10.   Changes in function:  Yes (See Goal flowsheet attached for changes in current functional level)  Adverse reaction to treatment or activity: None    OBJECTIVE  Changes noted in objective findings: Flexion to distal tibia, tightness primarily, extension to 20 deg, tigtness, side bend L 6\" above knee, tigtness, SB R 4\" above knee, tightness.      ASSESSMENT/PLAN  Diagnosis: radicular LBP   DIAGP:  Diagnoses of Chronic bilateral low back pain with left-sided sciatica and SI (sacroiliac) joint dysfunction were pertinent to this visit.  Updated problem list and treatment plan:   Pain - HEP  Decreased ROM/flexibility - HEP  Decreased function - HEP  Inflammation - HEP  STG/LTGs have been met or progress has been made towards goals:  Yes, please see goal flowsheet for most current information  Assessment of Progress: current status is unknown.    Last current status: Pt is progressing slower than anticipated   Self Management Plans:  HEP  I have re-evaluated this patient and find that the nature, scope, duration and intensity of the therapy is appropriate for the medical condition of the patient.  Jim continues to require the following intervention to meet STG and LTG's:  HEP.    Recommendations:  Discharge with current home program.  Patient to follow up with MD as needed.    Please refer to the " daily flowsheet for treatment today, total treatment time and time spent performing 1:1 timed codes.         confused, alert ,responds only to name

## 2018-11-14 NOTE — ED ADULT TRIAGE NOTE - NS ED TRIAGE AVPU SCALE
Verbal - The patient responds to verbal stimuli by opening their eyes when someone speaks to them. The patient is not fully oriented to time, place, or person.
Verbal - The patient responds to verbal stimuli by opening their eyes when someone speaks to them. The patient is not fully oriented to time, place, or person.

## 2018-11-14 NOTE — ED ADULT NURSE NOTE - CHIEF COMPLAINT QUOTE
" He became unresponsive, not waking up at the NH for a few minutes, pt usually non verbal "
" He became unresponsive, not waking up at the NH for a few minutes, pt usually non verbal "

## 2018-11-14 NOTE — ED PROVIDER NOTE - OBJECTIVE STATEMENT
89 y/o M with hx of dementia, cerebral infarction, HTN, HLD, hypothyroidism biba from [facility] assisted living for evaluation s/p episode of unresponsiveness this morning. As per aide from facility at bedside, pt was walking around this morning and ate his breakfast and then was sitting on the chair when he became unresponsive for ?few minutes, laid pt down on bed and oxygen given. Denies fall/head trauma. Pt awake and responsive at this time.     PMD: Dr. Reyes

## 2018-11-14 NOTE — ED ADULT NURSE NOTE - EENT ASSESSMENT, MLM
- Admit to ACS service  - NGT monitoring  - Continue NPO + IVF  - Serial abdominal exam  - DVT ppx  - Monitor bowel function  - GI ppx - Continue NPO  - Needs NGT. Will again attempt to convince patient about benefits of NGT  - Serial abdominal exam  - Monitor bowel function  - DVT ppx  - GI ppx continue NPO with iv fluids  discontinue morphine  serial abd exams  possible OR continue regular diet  f/u Tello function  Lovenox for DVT prophylaxis  encourage ambulation  possible discharge today diet advanced to clear liquid today  f/u diet tolerance  encourage ambulation  pain management  lovenox for DVT prophylaxis - - -

## 2018-11-14 NOTE — PATIENT PROFILE ADULT - NSPROPTRIGHTBILLOFRIGHTS_GEN_A_NUR
How Severe Is Your Skin Condition? (The Patient Describes The Severity Level As....): mild
What Brings You In Today? (This Is An Xx Year Old Patient Who Presents For...): Facial hair
When Did You First Notice It? (The Patient First Noticed It...): Years ago
Where On Your Body Is It? (Located On...): Chin
What Previous Treatments Have You Tried? (The Patient Has Tried The Following Treatments...): Plucking
Did Anything Happen To Make You Want To Come In For This Specifically Today? (The Specific Reason That The Patient Came In Today Was Because:): Evaluation
patient

## 2018-11-14 NOTE — ED PROVIDER NOTE - CONSTITUTIONAL MENTATION
demented, responsive to verbal stimuli/awake
awake/ALTERED LOC/responsive to verbal stimuli, demented, slow to response

## 2018-11-14 NOTE — ED PROVIDER NOTE - CARDIAC, MLM
Normal rate, regular rhythm.  Heart sounds S1, S2.
Normal rate, regular rhythm.  Heart sounds S1, S2.

## 2018-11-14 NOTE — ED PROVIDER NOTE - PMH
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
CAD (coronary artery disease)    Dementia    Depression    Dizziness    Hyperlipidemia    Hypertension

## 2018-11-14 NOTE — ED PROVIDER NOTE - PROGRESS NOTE DETAILS
Attending Note: 89 y/o M pt w/ PMHx CAD, dementia BIBA for unresponsiveness today. Pt denies pain but cannot provide hx. Vital signs stable, afebrile. Generalized weakness but non-focal exam. Plan - CT brain, labs, UA. r/o infection vs. neurologic event. Pt examined by ED attending, Dr. Partida who agreed with disposition and plan. Spoke to neurologist, Dr. Dickinson, will consult pt in ED now. Paged Dr. Reich Spoke to Dr. Reich, discussed case and results, accepted admission to her service.

## 2018-11-14 NOTE — ED ADULT NURSE NOTE - NSIMPLEMENTINTERV_GEN_ALL_ED
Implemented All Fall with Harm Risk Interventions:  [place] to call system. Call bell, personal items and telephone within reach. Instruct patient to call for assistance. Room bathroom lighting operational. Non-slip footwear when patient is off stretcher. Physically safe environment: no spills, clutter or unnecessary equipment. Stretcher in lowest position, wheels locked, appropriate side rails in place. Provide visual cue, wrist band, yellow gown, etc. Monitor gait and stability. Monitor for mental status changes and reorient to person, place, and time. Review medications for side effects contributing to fall risk. Reinforce activity limits and safety measures with patient and family. Provide visual clues: red socks.

## 2018-11-14 NOTE — ED PROVIDER NOTE - CARE PLAN
Principal Discharge DX:	Altered mental status Principal Discharge DX:	Altered mental status  Secondary Diagnosis:	UTI (urinary tract infection)

## 2018-11-15 LAB
ANION GAP SERPL CALC-SCNC: 9 MMOL/L — SIGNIFICANT CHANGE UP (ref 5–17)
BUN SERPL-MCNC: 26 MG/DL — HIGH (ref 7–23)
CALCIUM SERPL-MCNC: 8.9 MG/DL — SIGNIFICANT CHANGE UP (ref 8.4–10.5)
CHLORIDE SERPL-SCNC: 108 MMOL/L — SIGNIFICANT CHANGE UP (ref 96–108)
CHOLEST SERPL-MCNC: 162 MG/DL — SIGNIFICANT CHANGE UP (ref 10–199)
CO2 SERPL-SCNC: 26 MMOL/L — SIGNIFICANT CHANGE UP (ref 22–31)
CREAT SERPL-MCNC: 1.15 MG/DL — SIGNIFICANT CHANGE UP (ref 0.5–1.3)
CULTURE RESULTS: SIGNIFICANT CHANGE UP
GLUCOSE SERPL-MCNC: 84 MG/DL — SIGNIFICANT CHANGE UP (ref 70–99)
HCT VFR BLD CALC: 32.8 % — LOW (ref 39–50)
HDLC SERPL-MCNC: 53 MG/DL — SIGNIFICANT CHANGE UP
HGB BLD-MCNC: 10.8 G/DL — LOW (ref 13–17)
LIPID PNL WITH DIRECT LDL SERPL: 98 MG/DL — SIGNIFICANT CHANGE UP
MCHC RBC-ENTMCNC: 32.6 PG — SIGNIFICANT CHANGE UP (ref 27–34)
MCHC RBC-ENTMCNC: 32.9 GM/DL — SIGNIFICANT CHANGE UP (ref 32–36)
MCV RBC AUTO: 99.1 FL — SIGNIFICANT CHANGE UP (ref 80–100)
NRBC # BLD: 0 /100 WBCS — SIGNIFICANT CHANGE UP (ref 0–0)
PLATELET # BLD AUTO: 169 K/UL — SIGNIFICANT CHANGE UP (ref 150–400)
POTASSIUM SERPL-MCNC: 4 MMOL/L — SIGNIFICANT CHANGE UP (ref 3.5–5.3)
POTASSIUM SERPL-SCNC: 4 MMOL/L — SIGNIFICANT CHANGE UP (ref 3.5–5.3)
RBC # BLD: 3.31 M/UL — LOW (ref 4.2–5.8)
RBC # FLD: 13.7 % — SIGNIFICANT CHANGE UP (ref 10.3–14.5)
SODIUM SERPL-SCNC: 143 MMOL/L — SIGNIFICANT CHANGE UP (ref 135–145)
SPECIMEN SOURCE: SIGNIFICANT CHANGE UP
TOTAL CHOLESTEROL/HDL RATIO MEASUREMENT: 3.1 RATIO — LOW (ref 3.4–9.6)
TRIGL SERPL-MCNC: 57 MG/DL — SIGNIFICANT CHANGE UP (ref 10–149)
TSH SERPL-MCNC: 1.41 UIU/ML — SIGNIFICANT CHANGE UP (ref 0.27–4.2)
WBC # BLD: 11.25 K/UL — HIGH (ref 3.8–10.5)
WBC # FLD AUTO: 11.25 K/UL — HIGH (ref 3.8–10.5)

## 2018-11-15 RX ORDER — RISPERIDONE 4 MG/1
0.5 TABLET ORAL AT BEDTIME
Qty: 0 | Refills: 0 | Status: DISCONTINUED | OUTPATIENT
Start: 2018-11-15 | End: 2018-11-26

## 2018-11-15 RX ADMIN — ENOXAPARIN SODIUM 30 MILLIGRAM(S): 100 INJECTION SUBCUTANEOUS at 11:52

## 2018-11-15 RX ADMIN — Medication 100 MILLIGRAM(S): at 11:52

## 2018-11-15 RX ADMIN — PANTOPRAZOLE SODIUM 40 MILLIGRAM(S): 20 TABLET, DELAYED RELEASE ORAL at 08:46

## 2018-11-15 RX ADMIN — Medication 325 MILLIGRAM(S): at 12:22

## 2018-11-15 RX ADMIN — Medication 1 TABLET(S): at 08:46

## 2018-11-15 RX ADMIN — LOSARTAN POTASSIUM 25 MILLIGRAM(S): 100 TABLET, FILM COATED ORAL at 05:58

## 2018-11-15 RX ADMIN — RISPERIDONE 0.5 MILLIGRAM(S): 4 TABLET ORAL at 21:04

## 2018-11-15 RX ADMIN — RISPERIDONE 0.5 MILLIGRAM(S): 4 TABLET ORAL at 05:58

## 2018-11-15 RX ADMIN — Medication 1 TABLET(S): at 11:52

## 2018-11-15 RX ADMIN — Medication 81 MILLIGRAM(S): at 11:52

## 2018-11-15 RX ADMIN — Medication 1 TABLET(S): at 15:55

## 2018-11-15 RX ADMIN — CEFTRIAXONE 100 GRAM(S): 500 INJECTION, POWDER, FOR SOLUTION INTRAMUSCULAR; INTRAVENOUS at 15:54

## 2018-11-15 NOTE — CONSULT NOTE ADULT - SUBJECTIVE AND OBJECTIVE BOX
HPI:  89 y/o M with hx of dementia, CAD, GERD, anemia, HLD, HTN, depression, aspiration PNA, biba   from [facility] with CC of episode of unresponsiveness. No fall/head trauma. Pt unable   to provide any history. No n/v/d. + fever here and mildly elevated WBC. CXR negative for pna.  UA +/- positive.     Infectious Disease consult was called to evaluate pt and for antibiotic management.    Past Medical & Surgical Hx:  PAST MEDICAL & SURGICAL HISTORY:  Dementia  Depression  Hyperlipidemia  Dizziness  CAD (coronary artery disease)  Hypertension  No significant past surgical history      Social History--  EtOH: denies   Tobacco: denies   Drug Use: denies     FAMILY HISTORY:  No pertinent family history in first degree relatives      Allergies  No Known Allergies    Home Medications:  aspirin 81 mg oral tablet, chewable: 1 tab(s) orally once a day (14 Nov 2018 16:15)  Colace 100 mg oral capsule: 1 cap(s) orally once a day (14 Nov 2018 16:15)  ferrous sulfate 325 mg (65 mg elemental iron) oral tablet: 1 tab(s) orally once a day (14 Nov 2018 14:48)  Klor-Con 10 10 mEq oral tablet, extended release: orally once a day (14 Nov 2018 14:48)  Lasix 40 mg oral tablet: 1 tab(s) orally once a day (14 Nov 2018 14:48)  losartan 25 mg oral tablet: 1 tab(s) orally once a day (14 Nov 2018 14:48)  Protonix 40 mg oral granule, enteric coated: 1 each orally once a day (14 Nov 2018 14:48)  Risperdal 0.5 mg oral tablet: orally 3 times a day (14 Nov 2018 14:48)  Vitamin D3 2000 intl units oral tablet: 1 tab(s) orally once a day (14 Nov 2018 14:48)      Current Inpatient Medications :    ANTIBIOTICS:   cefTRIAXone   IVPB 1 Gram(s) IV Intermittent every 24 hours      OTHER RELEVANT MEDICATIONS :  aspirin  chewable 81 milliGRAM(s) Oral daily  docusate sodium 100 milliGRAM(s) Oral daily  enoxaparin Injectable 30 milliGRAM(s) SubCutaneous daily  ferrous    sulfate 325 milliGRAM(s) Oral daily  losartan 25 milliGRAM(s) Oral daily  pantoprazole   Suspension 40 milliGRAM(s) Oral before breakfast  risperiDONE   Tablet 0.5 milliGRAM(s) Oral at bedtime      ROS:  Unable to obtain due to : Dementia minimally verbal      I&O's Detail    14 Nov 2018 07:01  -  15 Nov 2018 07:00  --------------------------------------------------------  IN:    Sodium Chloride 0.9% IV Bolus: 1000 mL  Total IN: 1000 mL    OUT:    Voided: 500 mL  Total OUT: 500 mL    Total NET: 500 mL      Physical Exam:  Vital Signs Last 24 Hrs  T(C): 36.6 (15 Nov 2018 14:44), Max: 37.9 (15 Nov 2018 05:49)  T(F): 97.8 (15 Nov 2018 14:44), Max: 100.3 (15 Nov 2018 05:49)  HR: 55 (15 Nov 2018 14:44) (54 - 79)  BP: 120/58 (15 Nov 2018 14:44) (116/56 - 129/50)  BP(mean): --  RR: 14 (15 Nov 2018 14:44) (14 - 16)  SpO2: 98% (15 Nov 2018 14:44) (96% - 100%)      General: no acute distress weak  Eyes: sclera anicteric, pupils equal and reactive to light  ENMT: buccal mucosa moist, pharynx not injected  Neck: supple, trachea midline  Lungs: Decreased no wheeze/rhonchi  Cardiovascular: regular rate and rhythm, S1 S2  Abdomen: soft, nontender, no organomegaly present, bowel sounds normal  Neurological:  alert and oriented x0 Cranial Nerves II-XII grossly intact  Skin: no rash  Lymph Nodes: no palpable cervical/supraclavicular lymph nodes enlargements  Extremities: +trace edema    Labs:                         10.8   11.25 )-----------( 169      ( 15 Nov 2018 06:28 )             32.8   11-15    143  |  108  |  26<H>  ----------------------------<  84  4.0   |  26  |  1.15    Ca    8.9      15 Nov 2018 06:28    TPro  7.0  /  Alb  3.4  /  TBili  0.4  /  DBili  x   /  AST  23  /  ALT  24  /  AlkPhos  88  11-14      RECENT CULTURES:  PENDINg    RADIOLOGY & ADDITIONAL STUDIES:    EXAM:  XR CHEST PORTABLE IMMED 1V                            PROCEDURE DATE:  11/14/2018          INTERPRETATION:    DATE OF STUDY: 11/14/18.    PRIOR: 5/29/18.    CLINICAL INDICATION: 89-yo-male - syncope; unresponsive. Assess for chest   process.      TECHNIQUE: portable chest.    FINDINGS:   The study is limited by low lung volumes.  Thoracic aortic atheromatous changes and ectasia.  The heart is magnified by technique.  No acute congestive changes.  Trace left pleural effusion seen with associated left basal atelectasis.  No right lung focal consolidation.  No pneumothorax.  There are degenerative changes of the thoracic spine.    IMPRESSION:   Trace left pleural effusion has developed since 5/23/18 exam - with   associated left basilar atelectasis.  Right lung clear.      Assessment :   89 y/o M with hx of dementia, CAD, GERD, anemia, HLD, HTN, depression, aspiration PNA, biba   from [facility] with CC of episode of unresponsiveness.   Rule out UTI    Plan :   Cont Rocephin  Fu cultures  Trend temps and wbc  Asp precautions    Continue with present regime .  Approptiate use of antibiotics and adverse effects reviewed.      I have discussed the above plan of care with patient/family in detail. They expressed understanding of the treatment plan . Risks, benefits and alternatives discussed in detail. I have asked if they have any questions or concerns and appropriately addressed them to the best of my ability .      > 45 minutes spent in direct patient care reviewing  the notes, lab data/ imaging , discussion with multidisciplinary team. All questions were addressed and answered to the best of my capacity .    Thank you for allowing me to participate in the care of your patient .      Andrea Lam MD  Infectious Disease  [phone]

## 2018-11-15 NOTE — PHYSICAL THERAPY INITIAL EVALUATION ADULT - PERTINENT HX OF CURRENT PROBLEM, REHAB EVAL
Pt. admitted from [facility] with syncope, AMS, unresponsiveness.  Head CT->neg.  ECHO->stage 1 diastolic dysfunction

## 2018-11-15 NOTE — PROGRESS NOTE ADULT - SUBJECTIVE AND OBJECTIVE BOX
Patient is a 89y old  Male who presents with a chief complaint of syncope and AMs (15 Nov 2018 09:28)      INTERVAL HPI/OVERNIGHT EVENTS:no acute event overnight    Home Medications:  aspirin 81 mg oral tablet, chewable: 1 tab(s) orally once a day (2018 16:15)  Colace 100 mg oral capsule: 1 cap(s) orally once a day (2018 16:15)  ferrous sulfate 325 mg (65 mg elemental iron) oral tablet: 1 tab(s) orally once a day (2018 14:48)  Klor-Con 10 10 mEq oral tablet, extended release: orally once a day (2018 14:48)  Lasix 40 mg oral tablet: 1 tab(s) orally once a day (2018 14:48)  losartan 25 mg oral tablet: 1 tab(s) orally once a day (2018 14:48)  Protonix 40 mg oral granule, enteric coated: 1 each orally once a day (2018 14:48)  Risperdal 0.5 mg oral tablet: orally 3 times a day (2018 14:48)  Vitamin D3 2000 intl units oral tablet: 1 tab(s) orally once a day (2018 14:48)      MEDICATIONS  (STANDING):  aspirin  chewable 81 milliGRAM(s) Oral daily  cefTRIAXone   IVPB 1 Gram(s) IV Intermittent every 24 hours  docusate sodium 100 milliGRAM(s) Oral daily  enoxaparin Injectable 30 milliGRAM(s) SubCutaneous daily  ferrous    sulfate 325 milliGRAM(s) Oral daily  lactobacillus acidophilus 1 Tablet(s) Oral three times a day with meals  losartan 25 milliGRAM(s) Oral daily  pantoprazole   Suspension 40 milliGRAM(s) Oral before breakfast  risperiDONE   Tablet 0.5 milliGRAM(s) Oral three times a day    MEDICATIONS  (PRN):      Allergies    No Known Allergies    Intolerances        REVIEW OF SYSTEMS:  unable as pt confused  Vital Signs Last 24 Hrs  T(C): 36.6 (15 Nov 2018 12:24), Max: 37.9 (15 Nov 2018 05:49)  T(F): 97.8 (15 Nov 2018 12:24), Max: 100.3 (15 Nov 2018 05:49)  HR: 54 (15 Nov 2018 12:24) (54 - 79)  BP: 125/52 (15 Nov 2018 12:24) (116/56 - 163/71)  BP(mean): --  RR: 14 (15 Nov 2018 12:24) (14 - 18)  SpO2: 98% (15 Nov 2018 12:24) (96% - 100%)    PHYSICAL EXAM:  GENERAL: frail  HEAD:  Atraumatic, Normocephalic  EYES: EOMI, PERRLA, conjunctiva and sclera clear  ENMT: Moist mucous membranes,   NECK: Supple, No JVD, Normal thyroid  NERVOUS SYSTEM: confused does not follow commands  CHEST/LUNG: Clear to percussion bilaterally; No rales, rhonchi, wheezing, or rubs  HEART: Regular rate and rhythm; No murmurs, rubs, or gallops  ABDOMEN: Soft, Nontender, Nondistended; Bowel sounds present  EXTREMITIES:  2+ Peripheral Pulses, No clubbing, cyanosis, or edema  LYMPH: No lymphadenopathy noted  SKIN: No rashes or lesions    LABS:                        10.8   11.25 )-----------( 169      ( 15 Nov 2018 06:28 )             32.8     11-15    143  |  108  |  26<H>  ----------------------------<  84  4.0   |  26  |  1.15    Ca    8.9      15 Nov 2018 06:28    TPro  7.0  /  Alb  3.4  /  TBili  0.4  /  DBili  x   /  AST  23  /  ALT  24  /  AlkPhos  88  11-14    PT/INR - ( 2018 15:03 )   PT: 11.8 sec;   INR: 1.08 ratio         PTT - ( 2018 15:03 )  PTT:31.7 sec  Urinalysis Basic - ( 2018 15:31 )    Color: Yellow / Appearance: Clear / S.010 / pH: x  Gluc: x / Ketone: Negative  / Bili: Negative / Urobili: Negative mg/dL   Blood: x / Protein: Negative mg/dL / Nitrite: Negative   Leuk Esterase: Small / RBC: 6-10 /HPF / WBC 11-25   Sq Epi: x / Non Sq Epi: x / Bacteria: Few      CAPILLARY BLOOD GLUCOSE              I&O's Summary    2018 07:01  -  15 Nov 2018 07:00  --------------------------------------------------------  IN: 1000 mL / OUT: 500 mL / NET: 500 mL  < from: US Transthoracic Echocardiogram w/Doppler Complete (18 @ 19:28) >    1. normal overall left ventricular systolic function with stage I   diastolic dysfunction  2. normal right ventricular systolic function  3. moderate to severe calcific aortic stenosis, measured valve peak   gradient 47 mmHg, measured aortic valve area 1.2 sq cm.  4. Mild tricuspid regurgitation.  5. Mild mitral regurgitation  Technically somewhat limited recommend clinical correlation.      < end of copied text >  < from: CT Head No Cont (18 @ 15:11) >  No acute intracranial hemorrhage or acute territorial infarct.    < end of copied text >        RADIOLOGY & ADDITIONAL TESTS:    Imaging Personally Reviewed:  [x ] YES  [ ] NO    Consultant(s) Notes Reviewed:  [x ] YES  [ ] NO    Care Discussed with Consultants/Other Providers [x ] YES  [ ] NO

## 2018-11-15 NOTE — PHYSICAL THERAPY INITIAL EVALUATION ADULT - ADDITIONAL COMMENTS
Lives in [facility].  Pt is a poor historian and unable to answer questions. As per EMR, pt required assist for ambulation.

## 2018-11-15 NOTE — CONSULT NOTE ADULT - ASSESSMENT
The patient is a 89 year old male with a history of dementia, HTN, HL, CAD, GERD, anemia who presented yesterday after an episode of unresponsiveness.    Plan:  - Etiology of unresponsiveness unclear and patient remains altered  - Telemetry with sinus rhythm, tachycardic at times, PACs, brief atrial runs which may be atrial fibrillation. No pauses noted.  - Echocardiogram with grossly normal LV systolic function, no significant valve issues  - Borderline febrile. On ceftriaxone for UTI.  - Neurology follow-up

## 2018-11-15 NOTE — PHYSICAL THERAPY INITIAL EVALUATION ADULT - IMPAIRED TRANSFERS: SIT/STAND, REHAB EVAL
decreased strength/impaired balance/cognition/Pt. kept lifting his leg in the air during standing transfers

## 2018-11-15 NOTE — CONSULT NOTE ADULT - SUBJECTIVE AND OBJECTIVE BOX
History of Present Illness: The patient is a 89 year old male with a history of dementia, HTN, HL, CAD, GERD, anemia who presented yesterday after an episode of unresponsiveness. The patient is unable to provide additional history to me. As per notes, he ate breakfast, was sitting on chair, and had an episode of unresponsiveness for a few minutes. He has had altered mental status since.    Past Medical/Surgical History:  dementia, HTN, HL, CAD, GERD, anemia    Medications:  MEDICATIONS  (STANDING):  aspirin  chewable 81 milliGRAM(s) Oral daily  cefTRIAXone   IVPB 1 Gram(s) IV Intermittent every 24 hours  docusate sodium 100 milliGRAM(s) Oral daily  enoxaparin Injectable 30 milliGRAM(s) SubCutaneous daily  ferrous    sulfate 325 milliGRAM(s) Oral daily  lactobacillus acidophilus 1 Tablet(s) Oral three times a day with meals  losartan 25 milliGRAM(s) Oral daily  pantoprazole   Suspension 40 milliGRAM(s) Oral before breakfast  risperiDONE   Tablet 0.5 milliGRAM(s) Oral three times a day    MEDICATIONS  (PRN):      Family History: Non-contributory family history of premature cardiovascular atherosclerotic disease    Social History: No tobacco, alcohol or drug use    Review of Systems:  Unable to obtain    Physical Exam:  Vitals:        Vital Signs Last 24 Hrs  T(C): 36.4 (15 Nov 2018 07:53), Max: 37.9 (15 Nov 2018 05:49)  T(F): 97.6 (15 Nov 2018 07:53), Max: 100.3 (15 Nov 2018 05:49)  HR: 66 (15 Nov 2018 07:53) (60 - 79)  BP: 116/56 (15 Nov 2018 07:53) (116/56 - 165/55)  BP(mean): --  RR: 16 (15 Nov 2018 07:53) (14 - 20)  SpO2: 100% (15 Nov 2018 07:53) (96% - 100%)  General: NAD  HEENT: MMM  Neck: No JVD, no carotid bruit  Lungs: CTAB  CV: RRR, nl S1/S2, no M/R/G  Abdomen: S/NT/ND, +BS  Extremities: No LE edema, no cyanosis  Neuro: AAOx3, non-focal  Skin: No rash    Labs:                        10.8   11.25 )-----------( 169      ( 15 Nov 2018 06:28 )             32.8     11-15    143  |  108  |  26<H>  ----------------------------<  84  4.0   |  26  |  1.15    Ca    8.9      15 Nov 2018 06:28    TPro  7.0  /  Alb  3.4  /  TBili  0.4  /  DBili  x   /  AST  23  /  ALT  24  /  AlkPhos  88  11-14    CARDIAC MARKERS ( 14 Nov 2018 15:03 )  .000 ng/mL / x     / x     / x     / x          PT/INR - ( 14 Nov 2018 15:03 )   PT: 11.8 sec;   INR: 1.08 ratio         PTT - ( 14 Nov 2018 15:03 )  PTT:31.7 sec    ECG: ?AF vs. NSR, LAD, anteroseptal Q waves

## 2018-11-15 NOTE — PROGRESS NOTE ADULT - SUBJECTIVE AND OBJECTIVE BOX
Neurology follow up note    AGOSTINO [id number]      Interval History:    Patient awake in bed    MEDICATIONS    aspirin  chewable 81 milliGRAM(s) Oral daily  cefTRIAXone   IVPB 1 Gram(s) IV Intermittent every 24 hours  docusate sodium 100 milliGRAM(s) Oral daily  enoxaparin Injectable 30 milliGRAM(s) SubCutaneous daily  ferrous    sulfate 325 milliGRAM(s) Oral daily  lactobacillus acidophilus 1 Tablet(s) Oral three times a day with meals  losartan 25 milliGRAM(s) Oral daily  pantoprazole   Suspension 40 milliGRAM(s) Oral before breakfast  risperiDONE   Tablet 0.5 milliGRAM(s) Oral three times a day      Allergies    No Known Allergies    Intolerances        Height (cm): 172.72 ( @ 12:29)  Weight (kg): 77.1 ( @ 12:29)  BMI (kg/m2): 25.8 ( @ 12:29)    Vital Signs Last 24 Hrs  T(C): 36.4 (15 Nov 2018 07:53), Max: 37.9 (15 Nov 2018 05:49)  T(F): 97.6 (15 Nov 2018 07:53), Max: 100.3 (15 Nov 2018 05:49)  HR: 66 (15 Nov 2018 07:53) (60 - 79)  BP: 116/56 (15 Nov 2018 07:53) (116/56 - 165/55)  BP(mean): --  RR: 16 (15 Nov 2018 07:53) (14 - 20)  SpO2: 100% (15 Nov 2018 07:53) (96% - 100%)      REVIEW OF SYSTEMS:  Limited or unable to obtain secondary to patient's poor mental status.        On Neurological Examination:    The patient is awake and alert.    Positive blink bilateral visual threat.  Pupils bilaterally 2 mm reactive to 1.    Speech, the patient would sat 1 to 2 words.  Normal baseline, he says a few words.    The patient is not following commands.    Motor:  Examination is limited.  I elevated bilateral upper extremities, was able to maintain elevated position.    Bilateral lower extremities with plantar stimulation, positive flexation at the hip and knee.   I did not notice any clear asymmetric movements.    Tone was increased bilateral upper and lower extremities.    Does not follow commands    GENERAL Exam: Nontoxic , No Acute Distress   	  HEENT:  normocephalic, atraumatic  		  LUNGS: Clear bilaterally  	  HEART: Normal S1S2   No murmur RRR        	  GI/ ABDOMEN:  Soft  Non tender    EXTREMITIES:   No Edema  No Clubbing  No Cyanosis     MUSCULOSKELETAL:  decreased Range of Motion all 4 extremities   	   SKIN: Normal  No Ecchymosis               LABS:  CBC Full  -  ( 15 Nov 2018 06:28 )  WBC Count : 11.25 K/uL  Hemoglobin : 10.8 g/dL  Hematocrit : 32.8 %  Platelet Count - Automated : 169 K/uL  Mean Cell Volume : 99.1 fl  Mean Cell Hemoglobin : 32.6 pg  Mean Cell Hemoglobin Concentration : 32.9 gm/dL  Auto Neutrophil # : x  Auto Lymphocyte # : x  Auto Monocyte # : x  Auto Eosinophil # : x  Auto Basophil # : x  Auto Neutrophil % : x  Auto Lymphocyte % : x  Auto Monocyte % : x  Auto Eosinophil % : x  Auto Basophil % : x    Urinalysis Basic - ( 2018 15:31 )    Color: Yellow / Appearance: Clear / S.010 / pH: x  Gluc: x / Ketone: Negative  / Bili: Negative / Urobili: Negative mg/dL   Blood: x / Protein: Negative mg/dL / Nitrite: Negative   Leuk Esterase: Small / RBC: 6-10 /HPF / WBC 11-25   Sq Epi: x / Non Sq Epi: x / Bacteria: Few      -15    143  |  108  |  26<H>  ----------------------------<  84  4.0   |  26  |  1.15    Ca    8.9      15 Nov 2018 06:28    TPro  7.0  /  Alb  3.4  /  TBili  0.4  /  DBili  x   /  AST  23  /  ALT  24  /  AlkPhos  88  11-14    Hemoglobin A1C:     LIVER FUNCTIONS - ( 2018 15:03 )  Alb: 3.4 g/dL / Pro: 7.0 g/dL / ALK PHOS: 88 U/L / ALT: 24 U/L DA / AST: 23 U/L / GGT: x           Vitamin B12   PT/INR - ( 2018 15:03 )   PT: 11.8 sec;   INR: 1.08 ratio         PTT - ( 2018 15:03 )  PTT:31.7 sec      RADIOLOGY    ANALYSIS AND PLAN:  This is a 89-year-old with episode of unresponsiveness, history of dementia.  1.	For episode of unresponsiveness, this appears most likely a syncopal event.  There was no clear history documented to suggest underlying epilepsy and seizure attack, and there is no clear signs on examination to suggest a new cerebrovascular accident had ensued.  2.	I would recommend telemetry evaluation for underlying arrhythmia.  3.	Monitor systolic blood pressure.  4.	For history of dementia secondary to the patient's age and appears to be advanced, we would recommend supportive therapy.  5.	Attempted to contact family at [phone] yesterday  No response.    Thank you for the courtesy of consultation.    Physical therapy evaluation as tolerated  OOB to chair/ambulation with assistance only if possible.    Neurologic standpoint only cleared for discharge planning     Greater than 45 minutes spent in direct patient care reviewing  the notes, lab data/ imaging , discussion with multidisciplinary team.

## 2018-11-15 NOTE — PROGRESS NOTE ADULT - ASSESSMENT
89 y/o M with hx of dementia, CAD, GERD, anemia, HLD, HTN, depression, aspiration PNA, biba from [facility] assisted living for evaluation s/p episode of unresponsiveness this morning. As per aide from facility at bedside, pt was walking around this morning and ate his breakfast and then was sitting on the chair around 11am when he became unresponsive for ?few minutes, laid pt down on bed and oxygen given. Denies fall/head trauma. Pt unable to provide any history but denies pain. As per aide, altered mental status since becoming unresponsive. Responsive to verbal stimuli in ED.  afebrile in ED bp 165/55HR 64,  being admitted for AMS with h/o Cad HTn, Dementia , with syncope possible vasovagal, with possible UTi, cultures sent and started on ceftriaxone in ED.ECHO showes aortic stenosis and diastolic dysfunction , pt intermittently unresponsive, will hold and decrease resperdal, advanced care discussed with family and patient is DNR.  continue current care, leucocytosis will trend

## 2018-11-16 LAB
ANION GAP SERPL CALC-SCNC: 9 MMOL/L — SIGNIFICANT CHANGE UP (ref 5–17)
BUN SERPL-MCNC: 24 MG/DL — HIGH (ref 7–23)
CALCIUM SERPL-MCNC: 8.8 MG/DL — SIGNIFICANT CHANGE UP (ref 8.4–10.5)
CHLORIDE SERPL-SCNC: 108 MMOL/L — SIGNIFICANT CHANGE UP (ref 96–108)
CO2 SERPL-SCNC: 28 MMOL/L — SIGNIFICANT CHANGE UP (ref 22–31)
CREAT SERPL-MCNC: 1.31 MG/DL — HIGH (ref 0.5–1.3)
GLUCOSE SERPL-MCNC: 78 MG/DL — SIGNIFICANT CHANGE UP (ref 70–99)
HCT VFR BLD CALC: 33.5 % — LOW (ref 39–50)
HGB BLD-MCNC: 11 G/DL — LOW (ref 13–17)
MAGNESIUM SERPL-MCNC: 2.1 MG/DL — SIGNIFICANT CHANGE UP (ref 1.6–2.6)
MCHC RBC-ENTMCNC: 32.8 GM/DL — SIGNIFICANT CHANGE UP (ref 32–36)
MCHC RBC-ENTMCNC: 34.1 PG — HIGH (ref 27–34)
MCV RBC AUTO: 103.7 FL — HIGH (ref 80–100)
MRSA PCR RESULT.: SIGNIFICANT CHANGE UP
NRBC # BLD: 0 /100 WBCS — SIGNIFICANT CHANGE UP (ref 0–0)
PLATELET # BLD AUTO: 166 K/UL — SIGNIFICANT CHANGE UP (ref 150–400)
POTASSIUM SERPL-MCNC: 4 MMOL/L — SIGNIFICANT CHANGE UP (ref 3.5–5.3)
POTASSIUM SERPL-SCNC: 4 MMOL/L — SIGNIFICANT CHANGE UP (ref 3.5–5.3)
RBC # BLD: 3.23 M/UL — LOW (ref 4.2–5.8)
RBC # FLD: 13.8 % — SIGNIFICANT CHANGE UP (ref 10.3–14.5)
S AUREUS DNA NOSE QL NAA+PROBE: DETECTED
SODIUM SERPL-SCNC: 145 MMOL/L — SIGNIFICANT CHANGE UP (ref 135–145)
WBC # BLD: 7.91 K/UL — SIGNIFICANT CHANGE UP (ref 3.8–10.5)
WBC # FLD AUTO: 7.91 K/UL — SIGNIFICANT CHANGE UP (ref 3.8–10.5)

## 2018-11-16 PROCEDURE — 12345: CPT | Mod: NC

## 2018-11-16 RX ORDER — AMOXICILLIN 250 MG/5ML
500 SUSPENSION, RECONSTITUTED, ORAL (ML) ORAL EVERY 8 HOURS
Qty: 0 | Refills: 0 | Status: COMPLETED | OUTPATIENT
Start: 2018-11-18 | End: 2018-11-22

## 2018-11-16 RX ORDER — LOSARTAN POTASSIUM 100 MG/1
25 TABLET, FILM COATED ORAL ONCE
Qty: 0 | Refills: 0 | Status: COMPLETED | OUTPATIENT
Start: 2018-11-16 | End: 2018-11-16

## 2018-11-16 RX ADMIN — RISPERIDONE 0.5 MILLIGRAM(S): 4 TABLET ORAL at 21:55

## 2018-11-16 RX ADMIN — CEFTRIAXONE 100 GRAM(S): 500 INJECTION, POWDER, FOR SOLUTION INTRAMUSCULAR; INTRAVENOUS at 16:05

## 2018-11-16 RX ADMIN — Medication 1 TABLET(S): at 10:03

## 2018-11-16 RX ADMIN — ENOXAPARIN SODIUM 30 MILLIGRAM(S): 100 INJECTION SUBCUTANEOUS at 13:02

## 2018-11-16 RX ADMIN — Medication 1 TABLET(S): at 17:29

## 2018-11-16 RX ADMIN — Medication 81 MILLIGRAM(S): at 13:01

## 2018-11-16 RX ADMIN — LOSARTAN POTASSIUM 25 MILLIGRAM(S): 100 TABLET, FILM COATED ORAL at 05:40

## 2018-11-16 RX ADMIN — Medication 325 MILLIGRAM(S): at 13:03

## 2018-11-16 RX ADMIN — Medication 1 TABLET(S): at 13:01

## 2018-11-16 RX ADMIN — Medication 100 MILLIGRAM(S): at 13:01

## 2018-11-16 RX ADMIN — LOSARTAN POTASSIUM 25 MILLIGRAM(S): 100 TABLET, FILM COATED ORAL at 19:32

## 2018-11-16 RX ADMIN — PANTOPRAZOLE SODIUM 40 MILLIGRAM(S): 20 TABLET, DELAYED RELEASE ORAL at 05:40

## 2018-11-16 NOTE — PROGRESS NOTE ADULT - ASSESSMENT
The patient is a 89 year old male with a history of dementia, HTN, HL, CAD, GERD, anemia who presented yesterday after an episode of unresponsiveness.    Plan:  - Etiology of unresponsiveness unclear and patient remains altered  - Telemetry with sinus bradycardia, no significant pauses  - Avoid rate lowering agents  - Echocardiogram with grossly normal LV systolic function, no significant valve issues  - On ceftriaxone for UTI.  - Neurology follow-up

## 2018-11-16 NOTE — PROGRESS NOTE ADULT - SUBJECTIVE AND OBJECTIVE BOX
Neurology follow up note    AGOSTINO [id number]      Interval History:    Patient awake in bed     MEDICATIONS    aspirin  chewable 81 milliGRAM(s) Oral daily  cefTRIAXone   IVPB 1 Gram(s) IV Intermittent every 24 hours  docusate sodium 100 milliGRAM(s) Oral daily  enoxaparin Injectable 30 milliGRAM(s) SubCutaneous daily  ferrous    sulfate 325 milliGRAM(s) Oral daily  lactobacillus acidophilus 1 Tablet(s) Oral three times a day with meals  losartan 25 milliGRAM(s) Oral daily  pantoprazole   Suspension 40 milliGRAM(s) Oral before breakfast  risperiDONE   Tablet 0.5 milliGRAM(s) Oral at bedtime      Allergies    No Known Allergies    Intolerances            Vital Signs Last 24 Hrs  T(C): 36.7 (2018 05:58), Max: 36.8 (15 Nov 2018 18:05)  T(F): 98.1 (2018 05:58), Max: 98.3 (15 Nov 2018 18:05)  HR: 55 (2018 05:58) (54 - 64)  BP: 169/73 (2018 05:58) (120/58 - 169/73)  BP(mean): --  RR: 18 (2018 05:58) (14 - 18)  SpO2: 100% (2018 05:58) (97% - 100%)      REVIEW OF SYSTEMS:  Limited or unable to obtain secondary to patient's poor mental status.    On Neurological Examination:    The patient is awake and alert.    Positive blink bilateral visual threat.  Pupils bilaterally 2 mm reactive to 1.    Speech, the patient would sat 1 to 2 words.  Normal baseline, he says a few words.    The patient is not following commands.    Motor:  Examination is limited.  I elevated bilateral upper extremities, was able to maintain elevated position.    Bilateral lower extremities with plantar stimulation, positive flexation at the hip and knee.   I did not notice any clear asymmetric movements.    Tone was increased bilateral upper and lower extremities.    Does not follow commands    GENERAL Exam: Nontoxic , No Acute Distress   	  HEENT:  normocephalic, atraumatic  		  LUNGS: Clear bilaterally  	  HEART: Normal S1S2   No murmur RRR        	  GI/ ABDOMEN:  Soft  Non tender    EXTREMITIES:   No Edema  No Clubbing  No Cyanosis     MUSCULOSKELETAL:  decreased Range of Motion all 4 extremities   	   SKIN: Normal  No Ecchymosis             LABS:  CBC Full  -  ( 2018 08:04 )  WBC Count : 7.91 K/uL  Hemoglobin : 11.0 g/dL  Hematocrit : 33.5 %  Platelet Count - Automated : 166 K/uL  Mean Cell Volume : 103.7 fl  Mean Cell Hemoglobin : 34.1 pg  Mean Cell Hemoglobin Concentration : 32.8 gm/dL  Auto Neutrophil # : x  Auto Lymphocyte # : x  Auto Monocyte # : x  Auto Eosinophil # : x  Auto Basophil # : x  Auto Neutrophil % : x  Auto Lymphocyte % : x  Auto Monocyte % : x  Auto Eosinophil % : x  Auto Basophil % : x    Urinalysis Basic - ( 2018 15:31 )    Color: Yellow / Appearance: Clear / S.010 / pH: x  Gluc: x / Ketone: Negative  / Bili: Negative / Urobili: Negative mg/dL   Blood: x / Protein: Negative mg/dL / Nitrite: Negative   Leuk Esterase: Small / RBC: 6-10 /HPF / WBC -25   Sq Epi: x / Non Sq Epi: x / Bacteria: Few      -    145  |  108  |  24<H>  ----------------------------<  78  4.0   |  28  |  1.31<H>    Ca    8.8      2018 08:04    TPro  7.0  /  Alb  3.4  /  TBili  0.4  /  DBili  x   /  AST  23  /  ALT  24  /  AlkPhos  88  -14    Hemoglobin A1C:   Lipid Panel 11-15 @ 10:41  Total Cholesterol, Serum 162  LDL 98  Triglycerides 57    LIVER FUNCTIONS - ( 2018 15:03 )  Alb: 3.4 g/dL / Pro: 7.0 g/dL / ALK PHOS: 88 U/L / ALT: 24 U/L DA / AST: 23 U/L / GGT: x           Vitamin B12   PT/INR - ( 2018 15:03 )   PT: 11.8 sec;   INR: 1.08 ratio         PTT - ( 2018 15:03 )  PTT:31.7 sec      RADIOLOGY    ANALYSIS AND PLAN:  This is a 89-year-old with episode of unresponsiveness, history of dementia.  1.	For episode of unresponsiveness, this appears most likely a syncopal event.  There was no clear history documented to suggest underlying epilepsy and seizure attack, and there is no clear signs on examination to suggest a new cerebrovascular accident had ensued.  2.	I would recommend telemetry evaluation for underlying arrhythmia.  3.	Monitor systolic blood pressure.  4.	For history of dementia secondary to the patient's age and appears to be advanced, we would recommend supportive therapy.  5.	spoke to son family at [phone]  jaylin     Thank you for the courtesy of consultation.    Physical therapy evaluation as tolerated  OOB to chair/ambulation with assistance only if possible.    Neurologic standpoint only cleared for discharge planning     Greater than 45 minutes spent in direct patient care reviewing  the notes, lab data/ imaging , discussion with multidisciplinary team.

## 2018-11-16 NOTE — PROGRESS NOTE ADULT - SUBJECTIVE AND OBJECTIVE BOX
Chief Complaint: Syncope, AMS    Interval Events: No events overnight.    Review of Systems:  Unable to obtain    Physical Exam:  Vitals:        Vital Signs Last 24 Hrs  T(C): 36.7 (16 Nov 2018 05:58), Max: 36.8 (15 Nov 2018 18:05)  T(F): 98.1 (16 Nov 2018 05:58), Max: 98.3 (15 Nov 2018 18:05)  HR: 55 (16 Nov 2018 05:58) (54 - 64)  BP: 169/73 (16 Nov 2018 05:58) (120/58 - 169/73)  BP(mean): --  RR: 18 (16 Nov 2018 05:58) (14 - 18)  SpO2: 100% (16 Nov 2018 05:58) (97% - 100%)  General: NAD  HEENT: MMM  Neck: No JVD, no carotid bruit  Lungs: CTAB  CV: RRR, nl S1/S2, no M/R/G  Abdomen: S/NT/ND, +BS  Extremities: No LE edema, no cyanosis  Neuro: AAOx3, non-focal  Skin: No rash    Labs:             11-16    145  |  108  |  24<H>  ----------------------------<  78  4.0   |  28  |  1.31<H>    Ca    8.8      16 Nov 2018 08:04  Mg     2.1     11-16    TPro  7.0  /  Alb  3.4  /  TBili  0.4  /  DBili  x   /  AST  23  /  ALT  24  /  AlkPhos  88  11-14                        11.0   7.91  )-----------( 166      ( 16 Nov 2018 08:04 )             33.5       Telemetry: Sinus bradycardia down to upper 30s, short pauses

## 2018-11-16 NOTE — PROGRESS NOTE ADULT - SUBJECTIVE AND OBJECTIVE BOX
Patient is a 89y old  Male who presents with a chief complaint of syncope and AMs (2018 09:01)      INTERVAL HPI/OVERNIGHT EVENTS:  no acute event overnight  Home Medications:  aspirin 81 mg oral tablet, chewable: 1 tab(s) orally once a day (2018 16:15)  Colace 100 mg oral capsule: 1 cap(s) orally once a day (2018 16:15)  ferrous sulfate 325 mg (65 mg elemental iron) oral tablet: 1 tab(s) orally once a day (2018 14:48)  Klor-Con 10 10 mEq oral tablet, extended release: orally once a day (2018 14:48)  Lasix 40 mg oral tablet: 1 tab(s) orally once a day (2018 14:48)  losartan 25 mg oral tablet: 1 tab(s) orally once a day (2018 14:48)  Protonix 40 mg oral granule, enteric coated: 1 each orally once a day (2018 14:48)  Risperdal 0.5 mg oral tablet: orally 3 times a day (2018 14:48)  Vitamin D3 2000 intl units oral tablet: 1 tab(s) orally once a day (2018 14:48)      MEDICATIONS  (STANDING):  aspirin  chewable 81 milliGRAM(s) Oral daily  cefTRIAXone   IVPB 1 Gram(s) IV Intermittent every 24 hours  docusate sodium 100 milliGRAM(s) Oral daily  enoxaparin Injectable 30 milliGRAM(s) SubCutaneous daily  ferrous    sulfate 325 milliGRAM(s) Oral daily  lactobacillus acidophilus 1 Tablet(s) Oral three times a day with meals  losartan 25 milliGRAM(s) Oral daily  pantoprazole   Suspension 40 milliGRAM(s) Oral before breakfast  risperiDONE   Tablet 0.5 milliGRAM(s) Oral at bedtime    MEDICATIONS  (PRN):      Allergies    No Known Allergies    Intolerances        REVIEW OF SYSTEMS:  unable as confused  Vital Signs Last 24 Hrs  T(C): 36.4 (2018 10:29), Max: 36.8 (15 Nov 2018 18:05)  T(F): 97.5 (2018 10:29), Max: 98.3 (15 Nov 2018 18:05)  HR: 59 (2018 10:29) (54 - 64)  BP: 175/71 (2018 10:29) (120/58 - 175/71)  BP(mean): --  RR: 20 (2018 10:29) (14 - 20)  SpO2: 100% (2018 10:29) (97% - 100%)    PHYSICAL EXAM:  GENERAL:  well-groomed, well-developed  HEAD:  Atraumatic, Normocephalic  EYES: EOMI, PERRLA, conjunctiva and sclera clear  ENMT: Moist mucous membranes,   NECK: Supple, No JVD, Normal thyroid  NERVOUS SYSTEM: confused, accepts feeding well  CHEST/LUNG: Clear to percussion bilaterally; No rales, rhonchi, wheezing, or rubs  HEART: Regular rate and rhythm; No murmurs, rubs, or gallops  ABDOMEN: Soft, Nontender, Nondistended; Bowel sounds present  EXTREMITIES:  2+ Peripheral Pulses, No clubbing, cyanosis, or edema  LYMPH: No lymphadenopathy noted  SKIN: No rashes or lesions    LABS:                        11.0   7.91  )-----------( 166      ( 2018 08:04 )             33.5         145  |  108  |  24<H>  ----------------------------<  78  4.0   |  28  |  1.31<H>    Ca    8.8      2018 08:04  Mg     2.1         TPro  7.0  /  Alb  3.4  /  TBili  0.4  /  DBili  x   /  AST  23  /  ALT  24  /  AlkPhos  88  11-14    PT/INR - ( 2018 15:03 )   PT: 11.8 sec;   INR: 1.08 ratio         PTT - ( 2018 15:03 )  PTT:31.7 sec  Urinalysis Basic - ( 2018 15:31 )    Color: Yellow / Appearance: Clear / S.010 / pH: x  Gluc: x / Ketone: Negative  / Bili: Negative / Urobili: Negative mg/dL   Blood: x / Protein: Negative mg/dL / Nitrite: Negative   Leuk Esterase: Small / RBC: 6-10 /HPF / WBC 11-25   Sq Epi: x / Non Sq Epi: x / Bacteria: Few      CAPILLARY BLOOD GLUCOSE            Culture - Urine (collected 18 @ 21:26)  Source: .Urine Clean Catch (Midstream)  Final Report (11-15-18 @ 22:33):    10,000 - 49,000 CFU/mL Streptococcus agalactiae (Group B) isolated    Group B streptococci are susceptible to ampicillin,    penicillin and cefazolin, but may be resistant to    erythromycin and clindamycin.    Recommendations for intrapartum prophylaxis for Group B    streptococci are penicillin or ampicillin.    Culture - Blood (collected 18 @ 21:18)  Source: .Blood Blood  Preliminary Report (11-15-18 @ 22:02):    No growth to date.    Culture - Blood (collected 18 @ 21:18)  Source: .Blood Blood  Preliminary Report (11-15-18 @ 22:02):    No growth to date.        I&O's Summary    2018 07:01  -  2018 11:21  --------------------------------------------------------  IN: 120 mL / OUT: 0 mL / NET: 120 mL        RADIOLOGY & ADDITIONAL TESTS:    Imaging Personally Reviewed:  [x ] YES  [ ] NO    Consultant(s) Notes Reviewed:  [x ] YES  [ ] NO    Care Discussed with Consultants/Other Providers [x ] YES  [ ] NO

## 2018-11-16 NOTE — PROGRESS NOTE ADULT - SUBJECTIVE AND OBJECTIVE BOX
NATIVIDAD LOMELI is a 89yMale , patient examined and chart reviewed.      INTERVAL HPI/ OVERNIGHT EVENTS:   More awake. confused.  Afebrile.    PAST MEDICAL & SURGICAL HISTORY:  Dementia  Depression  Hyperlipidemia  Dizziness  CAD (coronary artery disease)  Hypertension  No significant past surgical history    For details regarding the patient's social history, family history, and other miscellaneous elements, please refer the initial infectious diseases consultation and/or the admitting history and physical examination for this admission.    ROS:  Unable to obtain due to : confused    Current inpatient medications :    ANTIBIOTICS/RELEVANT:  cefTRIAXone   IVPB 1 Gram(s) IV Intermittent every 24 hours  lactobacillus acidophilus 1 Tablet(s) Oral three times a day with meals      aspirin  chewable 81 milliGRAM(s) Oral daily  docusate sodium 100 milliGRAM(s) Oral daily  enoxaparin Injectable 30 milliGRAM(s) SubCutaneous daily  ferrous    sulfate 325 milliGRAM(s) Oral daily  losartan 25 milliGRAM(s) Oral daily  pantoprazole   Suspension 40 milliGRAM(s) Oral before breakfast  risperiDONE   Tablet 0.5 milliGRAM(s) Oral at bedtime      Objective:     @ 07:01  -   @ 14:35  --------------------------------------------------------  IN: 120 mL / OUT: 0 mL / NET: 120 mL      T(C): 36.4 (18 @ 14:06), Max: 36.8 (11-15-18 @ 18:05)  HR: 50 (18 @ 14:06) (50 - 64)  BP: 166/63 (18 @ 14:06) (120/58 - 175/71)  RR: 20 (18 @ 14:06) (14 - 20)  SpO2: 100% (18 @ 14:06) (97% - 100%)  Wt(kg): --      Physical Exam:  General: no acute distress awake confused  Eyes: sclera anicteric, pupils equal and reactive to light  ENMT: buccal mucosa moist, pharynx not injected  Neck: supple, trachea midline  Lungs: clear, no wheeze/rhonchi  Cardiovascular: regular rate and rhythm, S1 S2  Abdomen: soft, nontender, no organomegaly present, bowel sounds normal  Neurological: alert and oriented x0, Cranial Nerves II-XII grossly intact  Skin: no increased ecchymosis/petechiae/purpura  Lymph Nodes: no palpable cervical/supraclavicular lymph nodes enlargements  Extremities: no cyanosis/clubbing/edema    LABS:                          11.0   7.91  )-----------( 166      ( 2018 08:04 )             33.5       11    145  |  108  |  24<H>  ----------------------------<  78  4.0   |  28  |  1.31<H>    Ca    8.8      2018 08:04  Mg     2.1         TPro  7.0  /  Alb  3.4  /  TBili  0.4  /  DBili  x   /  AST  23  /  ALT  24  /  AlkPhos  88  11-14      PT/INR - ( 2018 15:03 )   PT: 11.8 sec;   INR: 1.08 ratio         PTT - ( 2018 15:03 )  PTT:31.7 sec  Urinalysis Basic - ( 2018 15:31 )    Color: Yellow / Appearance: Clear / S.010 / pH: x  Gluc: x / Ketone: Negative  / Bili: Negative / Urobili: Negative mg/dL   Blood: x / Protein: Negative mg/dL / Nitrite: Negative   Leuk Esterase: Small / RBC: 6-10 /HPF / WBC 11-25   Sq Epi: x / Non Sq Epi: x / Bacteria: Few      MICROBIOLOGY:    Culture - Urine (collected 2018 21:26)  Source: .Urine Clean Catch (Midstream)  Final Report (15 Nov 2018 22:33):    10,000 - 49,000 CFU/mL Streptococcus agalactiae (Group B) isolated    Group B streptococci are susceptible to ampicillin,    penicillin and cefazolin, but may be resistant to    erythromycin and clindamycin.    Recommendations for intrapartum prophylaxis for Group B    streptococci are penicillin or ampicillin.    Culture - Blood (collected 2018 21:18)  Source: .Blood Blood  Preliminary Report (15 Nov 2018 22:02):    No growth to date.    Culture - Blood (collected 2018 21:18)  Source: .Blood Blood  Preliminary Report (15 Nov 2018 22:02):    No growth to date.    RADIOLOGY & ADDITIONAL STUDIES:  EXAM:  XR CHEST PORTABLE IMMED 1V                            PROCEDURE DATE:  2018        INTERPRETATION:    DATE OF STUDY: 18.    PRIOR: 18.    CLINICAL INDICATION: 89-yo-male - syncope; unresponsive. Assess for chest   process.      TECHNIQUE: portable chest.    FINDINGS:   The study is limited by low lung volumes.  Thoracic aortic atheromatous changes and ectasia.  The heart is magnified by technique.  No acute congestive changes.  Trace left pleural effusion seen with associated left basal atelectasis.  No right lung focal consolidation.  No pneumothorax.  There are degenerative changes of the thoracic spine.    IMPRESSION:   Trace left pleural effusion has developed since 18 exam - with   associated left basilar atelectasis.  Right lung clear.    Assessment :  89 y/o M with hx of dementia, CAD, GERD, anemia, HLD, HTN, depression, aspiration PNA, biba   from [facility] with CC of episode of unresponsiveness.   Poss UTI  Ucx noted    Plan :   Cont Rocephin   Change to Amoxicillin in 48 hours  Trend temps and wbc  Asp precautions    D/w Dr Reich    Continue with present regiment.  Appropriate use of antibiotics and adverse effects reviewed.      I have discussed the above plan of care with patient/ family in detail. They expressed understanding of the  treatment plan . Risks, benefits and alternatives discussed in detail. I have asked if they have any questions or concerns and appropriately addressed them to the best of my ability .    > 35 minutes were spent in direct patient care reviewing notes, medications ,labs data/ imaging , discussion with multidisciplinary team.    Thank you for allowing me to participate in care of your patient .    Andrea Lam MD  Infectious Disease  [phone]

## 2018-11-16 NOTE — PROGRESS NOTE ADULT - ASSESSMENT
89 y/o M with hx of dementia, CAD, GERD, anemia, HLD, HTN, depression, aspiration PNA, biba from [facility] assisted living for evaluation s/p episode of unresponsiveness this morning. As per aide from facility at bedside, pt was walking around this morning and ate his breakfast and then was sitting on the chair around 11am when he became unresponsive for ?few minutes, laid pt down on bed and oxygen given. Denies fall/head trauma. Pt unable to provide any history but denies pain. As per aide, altered mental status since becoming unresponsive. Responsive to verbal stimuli in ED.  afebrile in ED bp 165/55HR 64,  being admitted for AMS with h/o Cad HTn, Dementia , with syncope possible vasovagal, with possible UTi, cultures sent and started on ceftriaxone in ED.ECHO showes aortic stenosis and diastolic dysfunction , pt intermittently unresponsive, will hold and decrease resperdal, advanced care discussed with family and patient is DNR.  continue current care, leucocytosis resolved, patient is intermittently does not respond likely AMS with worsening dementia, Need help with all ADL and 2 person assist to ambulate,case d/w son  ID f up cont current TT

## 2018-11-17 RX ORDER — LOSARTAN POTASSIUM 100 MG/1
25 TABLET, FILM COATED ORAL ONCE
Qty: 0 | Refills: 0 | Status: COMPLETED | OUTPATIENT
Start: 2018-11-17 | End: 2018-11-17

## 2018-11-17 RX ORDER — LOSARTAN POTASSIUM 100 MG/1
50 TABLET, FILM COATED ORAL DAILY
Qty: 0 | Refills: 0 | Status: DISCONTINUED | OUTPATIENT
Start: 2018-11-18 | End: 2018-11-22

## 2018-11-17 RX ADMIN — LOSARTAN POTASSIUM 25 MILLIGRAM(S): 100 TABLET, FILM COATED ORAL at 14:26

## 2018-11-17 RX ADMIN — ENOXAPARIN SODIUM 30 MILLIGRAM(S): 100 INJECTION SUBCUTANEOUS at 12:29

## 2018-11-17 RX ADMIN — Medication 1 TABLET(S): at 12:29

## 2018-11-17 RX ADMIN — Medication 100 MILLIGRAM(S): at 12:29

## 2018-11-17 RX ADMIN — Medication 1 TABLET(S): at 17:07

## 2018-11-17 RX ADMIN — LOSARTAN POTASSIUM 25 MILLIGRAM(S): 100 TABLET, FILM COATED ORAL at 05:39

## 2018-11-17 RX ADMIN — Medication 1 TABLET(S): at 08:42

## 2018-11-17 RX ADMIN — CEFTRIAXONE 100 GRAM(S): 500 INJECTION, POWDER, FOR SOLUTION INTRAMUSCULAR; INTRAVENOUS at 15:02

## 2018-11-17 RX ADMIN — Medication 81 MILLIGRAM(S): at 12:29

## 2018-11-17 RX ADMIN — Medication 325 MILLIGRAM(S): at 12:29

## 2018-11-17 RX ADMIN — PANTOPRAZOLE SODIUM 40 MILLIGRAM(S): 20 TABLET, DELAYED RELEASE ORAL at 05:39

## 2018-11-17 NOTE — PROGRESS NOTE ADULT - SUBJECTIVE AND OBJECTIVE BOX
Neurology follow up note    AGOSTINO [id number]      Interval History:    Patient awake in bed     MEDICATIONS    aspirin  chewable 81 milliGRAM(s) Oral daily  cefTRIAXone   IVPB 1 Gram(s) IV Intermittent every 24 hours  docusate sodium 100 milliGRAM(s) Oral daily  enoxaparin Injectable 30 milliGRAM(s) SubCutaneous daily  ferrous    sulfate 325 milliGRAM(s) Oral daily  lactobacillus acidophilus 1 Tablet(s) Oral three times a day with meals  losartan 25 milliGRAM(s) Oral daily  pantoprazole   Suspension 40 milliGRAM(s) Oral before breakfast  risperiDONE   Tablet 0.5 milliGRAM(s) Oral at bedtime      Allergies    No Known Allergies    Intolerances            Vital Signs Last 24 Hrs  T(C): 36.4 (17 Nov 2018 05:30), Max: 36.9 (17 Nov 2018 00:50)  T(F): 97.6 (17 Nov 2018 05:30), Max: 98.4 (17 Nov 2018 00:50)  HR: 50 (17 Nov 2018 05:30) (50 - 62)  BP: 181/66 (17 Nov 2018 05:30) (158/75 - 181/66)  BP(mean): --  RR: 20 (17 Nov 2018 05:30) (20 - 20)  SpO2: 95% (17 Nov 2018 05:30) (92% - 100%)        REVIEW OF SYSTEMS:  Limited or unable to obtain secondary to patient's poor mental status.    On Neurological Examination:    The patient is awake and alert.    Positive blink bilateral visual threat.  Pupils bilaterally 2 mm reactive to 1.    Speech, the patient would sat 1 to 2 words.  Normal baseline, he says a few words.    The patient is not following commands.    Motor:  Examination is limited.  I elevated bilateral upper extremities, was able to maintain elevated position.    Bilateral lower extremities with plantar stimulation, positive flexation at the hip and knee.   I did not notice any clear asymmetric movements.    Tone was increased bilateral upper and lower extremities.    Does not follow commands    GENERAL Exam: Nontoxic , No Acute Distress   	  HEENT:  normocephalic, atraumatic  		  LUNGS: Clear bilaterally  	  HEART: Normal S1S2   No murmur RRR        	  GI/ ABDOMEN:  Soft  Non tender    EXTREMITIES:   No Edema  No Clubbing  No Cyanosis     MUSCULOSKELETAL:  decreased Range of Motion all 4 extremities   	   SKIN: Normal  No Ecchymosis      LABS:  CBC Full  -  ( 16 Nov 2018 08:04 )  WBC Count : 7.91 K/uL  Hemoglobin : 11.0 g/dL  Hematocrit : 33.5 %  Platelet Count - Automated : 166 K/uL  Mean Cell Volume : 103.7 fl  Mean Cell Hemoglobin : 34.1 pg  Mean Cell Hemoglobin Concentration : 32.8 gm/dL  Auto Neutrophil # : x  Auto Lymphocyte # : x  Auto Monocyte # : x  Auto Eosinophil # : x  Auto Basophil # : x  Auto Neutrophil % : x  Auto Lymphocyte % : x  Auto Monocyte % : x  Auto Eosinophil % : x  Auto Basophil % : x      11-16    145  |  108  |  24<H>  ----------------------------<  78  4.0   |  28  |  1.31<H>    Ca    8.8      16 Nov 2018 08:04  Mg     2.1     11-16      Hemoglobin A1C:       Vitamin B12         RADIOLOGY    ANALYSIS AND PLAN:  This is a 89-year-old with episode of unresponsiveness, history of dementia.  1.	For episode of unresponsiveness, this appears most likely a syncopal event.  There was no clear history documented to suggest underlying epilepsy and seizure attack, and there is no clear signs on examination to suggest a new cerebrovascular accident had ensued.  2.	I would recommend telemetry evaluation for underlying arrhythmia.  3.	Monitor systolic blood pressure.  4.	For history of dementia secondary to the patient's age and appears to be advanced, we would recommend supportive therapy.  5.	spoke to son family at [phone]  jaylin yesterday     Thank you for the courtesy of consultation.    Physical therapy evaluation as tolerated  OOB to chair/ambulation with assistance only if possible.    Neurologic standpoint only cleared for discharge planning     Greater than 45 minutes spent in direct patient care reviewing  the notes, lab data/ imaging , discussion with multidisciplinary team.

## 2018-11-17 NOTE — PROGRESS NOTE ADULT - ASSESSMENT
The patient is a 89 year old male with a history of dementia, HTN, HL, CAD, GERD, anemia who presented yesterday after an episode of unresponsiveness.    11/17/18  Patient awake and alert.  Essentially non-verbal.  Monitor compatible with sinus bradycardia about 45/minute, APC.    Plan:  - Etiology of unresponsiveness unclear and patient remains altered  - Telemetry with sinus bradycardia, no significant pauses  - Avoid rate lowering agents  - On Losartan  - Echocardiogram with grossly normal LV systolic function, no significant valve issues (see above report)  - On antibiotics  - Neurology and ID follow-up

## 2018-11-17 NOTE — PROGRESS NOTE ADULT - ASSESSMENT
89 y/o M with hx of dementia, CAD, GERD, anemia, HLD, HTN, depression, aspiration PNA, biba from [facility] assisted living for evaluation s/p episode of unresponsiveness this morning. As per aide from facility at bedside, pt was walking around this morning and ate his breakfast and then was sitting on the chair around 11am when he became unresponsive for ?few minutes, laid pt down on bed and oxygen given. Denies fall/head trauma. Pt unable to provide any history but denies pain. As per aide, altered mental status since becoming unresponsive. Responsive to verbal stimuli in ED.  afebrile in ED bp 165/55HR 64,  being admitted for AMS with h/o Cad HTn, Dementia , with syncope possible vasovagal, with possible UTi, cultures sent and started on ceftriaxone in ED.ECHO showes aortic stenosis and diastolic dysfunction , pt intermittently unresponsive, will hold and decrease resperdal, advanced care discussed with family and patient is DNR.  continue current care, leucocytosis resolved, patient is intermittently does not respond likely AMS with worsening dementia, Need help with all ADL and 2 person assist to ambulate,case d/w son  ID f up noted, cont current abx,

## 2018-11-17 NOTE — PROGRESS NOTE ADULT - SUBJECTIVE AND OBJECTIVE BOX
Patient is a 89y old  Male who presents with a chief complaint of syncope and AMs (16 Nov 2018 14:35)      INTERVAL HPI/OVERNIGHT EVENTS:    Home Medications:  aspirin 81 mg oral tablet, chewable: 1 tab(s) orally once a day (14 Nov 2018 16:15)  Colace 100 mg oral capsule: 1 cap(s) orally once a day (14 Nov 2018 16:15)  ferrous sulfate 325 mg (65 mg elemental iron) oral tablet: 1 tab(s) orally once a day (14 Nov 2018 14:48)  Klor-Con 10 10 mEq oral tablet, extended release: orally once a day (14 Nov 2018 14:48)  Lasix 40 mg oral tablet: 1 tab(s) orally once a day (14 Nov 2018 14:48)  losartan 25 mg oral tablet: 1 tab(s) orally once a day (14 Nov 2018 14:48)  Protonix 40 mg oral granule, enteric coated: 1 each orally once a day (14 Nov 2018 14:48)  Risperdal 0.5 mg oral tablet: orally 3 times a day (14 Nov 2018 14:48)  Vitamin D3 2000 intl units oral tablet: 1 tab(s) orally once a day (14 Nov 2018 14:48)      MEDICATIONS  (STANDING):  aspirin  chewable 81 milliGRAM(s) Oral daily  cefTRIAXone   IVPB 1 Gram(s) IV Intermittent every 24 hours  docusate sodium 100 milliGRAM(s) Oral daily  enoxaparin Injectable 30 milliGRAM(s) SubCutaneous daily  ferrous    sulfate 325 milliGRAM(s) Oral daily  lactobacillus acidophilus 1 Tablet(s) Oral three times a day with meals  losartan 25 milliGRAM(s) Oral daily  pantoprazole   Suspension 40 milliGRAM(s) Oral before breakfast  risperiDONE   Tablet 0.5 milliGRAM(s) Oral at bedtime    MEDICATIONS  (PRN):      Allergies    No Known Allergies    Intolerances        REVIEW OF SYSTEMS:unable as patient confused at base line  Vital Signs Last 24 Hrs  T(C): 36.4 (17 Nov 2018 05:30), Max: 36.9 (17 Nov 2018 00:50)  T(F): 97.6 (17 Nov 2018 05:30), Max: 98.4 (17 Nov 2018 00:50)  HR: 50 (17 Nov 2018 05:30) (50 - 62)  BP: 181/66 (17 Nov 2018 05:30) (158/75 - 181/66)  BP(mean): --  RR: 20 (17 Nov 2018 05:30) (20 - 20)  SpO2: 95% (17 Nov 2018 05:30) (92% - 100%)    PHYSICAL EXAM:  GENERAL: well-groomed, well-developed  HEAD:  Atraumatic, Normocephalic  EYES: EOMI, PERRLA, conjunctiva and sclera clear  ENMT: Moist mucous membranes,   NECK: Supple, No JVD, Normal thyroid  NERVOUS SYSTEM:  Alert & Oriented X0, poor memory and concentration; Motor Strength 3/5 B/L upper and lower extremities; DTRs 2+ intact and symmetric  CHEST/LUNG: Clear to percussion bilaterally; No rales, rhonchi, wheezing, or rubs  HEART: Regular rate and rhythm; No murmurs, rubs, or gallops  ABDOMEN: Soft, Nontender, Nondistended; Bowel sounds present  EXTREMITIES:  2+ Peripheral Pulses, No clubbing, cyanosis, or edema  SKIN: No rashes or lesions    LABS:                        11.0   7.91  )-----------( 166      ( 16 Nov 2018 08:04 )             33.5     11-16    145  |  108  |  24<H>  ----------------------------<  78  4.0   |  28  |  1.31<H>    Ca    8.8      16 Nov 2018 08:04  Mg     2.1     11-16          CAPILLARY BLOOD GLUCOSE            Culture - Urine (collected 11-14-18 @ 21:26)  Source: .Urine Clean Catch (Midstream)  Final Report (11-15-18 @ 22:33):    10,000 - 49,000 CFU/mL Streptococcus agalactiae (Group B) isolated    Group B streptococci are susceptible to ampicillin,    penicillin and cefazolin, but may be resistant to    erythromycin and clindamycin.    Recommendations for intrapartum prophylaxis for Group B    streptococci are penicillin or ampicillin.    Culture - Blood (collected 11-14-18 @ 21:18)  Source: .Blood Blood  Preliminary Report (11-15-18 @ 22:02):    No growth to date.    Culture - Blood (collected 11-14-18 @ 21:18)  Source: .Blood Blood  Preliminary Report (11-15-18 @ 22:02):    No growth to date.        I&O's Summary    16 Nov 2018 07:01  -  17 Nov 2018 06:43  --------------------------------------------------------  IN: 120 mL / OUT: 0 mL / NET: 120 mL        RADIOLOGY & ADDITIONAL TESTS:    Imaging Personally Reviewed:  [ x] YES  [ ] NO    Consultant(s) Notes Reviewed:  [ x] YES  [ ] NO    Care Discussed with Consultants/Other Providers [x ] YES  [ ] NO

## 2018-11-17 NOTE — PROGRESS NOTE ADULT - SUBJECTIVE AND OBJECTIVE BOX
Patient is a 89y Male with a known history of :  Prophylactic measure (Z29.9)  Alzheimer's dementia with behavioral disturbance, unspecified timing of dementia onset (G30.9)  Coronary artery disease involving native coronary artery of native heart without angina pectoris (I25.10)  Urinary tract infection without hematuria, site unspecified (N39.0)  Syncope, unspecified syncope type (R55)  Altered mental status, unspecified altered mental status type (R41.82)    HPI:  89 y/o M with hx of dementia, CAD, GERD, anemia, HLD, HTN, depression, aspiration PNA, biba from [facility] assisted living for evaluation s/p episode of unresponsiveness this morning. As per aide from facility at bedside, pt was walking around this morning and ate his breakfast and then was sitting on the chair around 11am when he became unresponsive for ?few minutes, laid pt down on bed and oxygen given. Denies fall/head trauma. Pt unable to provide any history but denies pain. As per aide, altered mental status since becoming unresponsive. Responsive to verbal stimuli in ED.  afebrile in ED bp 165/55HR 64,  being admitted for AMS with h/o Cad HTn, Dementia , with syncope possible vasovagal, with possible UTi, cultures sent and started on ceftriaxone in ED. (14 Nov 2018 18:01)      REVIEW OF SYSTEMS:    CONSTITUTIONAL: No fever, weight loss, or fatigue  EYES: No eye pain, visual disturbances, or discharge  ENMT:  No difficulty hearing, tinnitus, vertigo; No sinus or throat pain  NECK: No pain or stiffness  BREASTS: No pain, masses, or nipple discharge  RESPIRATORY: No cough, wheezing, chills or hemoptysis; No shortness of breath  CARDIOVASCULAR: No chest pain, palpitations, dizziness, or leg swelling  GASTROINTESTINAL: No abdominal or epigastric pain. No nausea, vomiting, or hematemesis; No diarrhea or constipation. No melena or hematochezia.  GENITOURINARY: No dysuria, frequency, hematuria, or incontinence  NEUROLOGICAL: No headaches, memory loss, loss of strength, numbness, or tremors  SKIN: No itching, burning, rashes, or lesions   LYMPH NODES: No enlarged glands  ENDOCRINE: No heat or cold intolerance; No hair loss  MUSCULOSKELETAL: No joint pain or swelling; No muscle, back, or extremity pain  PSYCHIATRIC: No depression, anxiety, mood swings, or difficulty sleeping  HEME/LYMPH: No easy bruising, or bleeding gums  ALLERGY AND IMMUNOLOGIC: No hives or eczema    MEDICATIONS  (STANDING):  aspirin  chewable 81 milliGRAM(s) Oral daily  cefTRIAXone   IVPB 1 Gram(s) IV Intermittent every 24 hours  docusate sodium 100 milliGRAM(s) Oral daily  enoxaparin Injectable 30 milliGRAM(s) SubCutaneous daily  ferrous    sulfate 325 milliGRAM(s) Oral daily  lactobacillus acidophilus 1 Tablet(s) Oral three times a day with meals  losartan 25 milliGRAM(s) Oral daily  pantoprazole   Suspension 40 milliGRAM(s) Oral before breakfast  risperiDONE   Tablet 0.5 milliGRAM(s) Oral at bedtime    MEDICATIONS  (PRN):      ALLERGIES: No Known Allergies      FAMILY HISTORY:  No pertinent family history in first degree relatives      Social history:  Alochol:   Smoking:   Drug Use:   Marital Status:     PHYSICAL EXAMINATION:  -----------------------------  T(C): 36.9 (11-17-18 @ 09:35), Max: 36.9 (11-17-18 @ 00:50)  HR: 50 (11-17-18 @ 09:35) (50 - 62)  BP: 175/52 (11-17-18 @ 09:35) (158/75 - 181/66)  RR: 20 (11-17-18 @ 09:35) (20 - 20)  SpO2: 96% (11-17-18 @ 09:35) (92% - 100%)  Wt(kg): --    11-16 @ 07:01  -  11-17 @ 07:00  --------------------------------------------------------  IN:    Oral Fluid: 120 mL  Total IN: 120 mL    OUT:  Total OUT: 0 mL    Total NET: 120 mL            Constitutional: well developed, normal appearance, well groomed, well nourished, no deformities and no acute distress.   Eyes: the conjunctiva exhibited no abnormalities and the eyelids demonstrated no xanthelasmas.   HEENT: normal oral mucosa, no oral pallor and no oral cyanosis.   Neck: normal jugular venous A waves present, normal jugular venous V waves present and no jugular venous saba A waves.   Pulmonary: no respiratory distress, normal respiratory rhythm and effort, no accessory muscle use and lungs were clear to auscultation bilaterally. Anteriorly  Cardiovascular: heart rate and rhythm were bradycardic with occasional ectopic, normal S1 and S2 and no murmur, gallop, rub, heave or thrill are present.    Musculoskeletal: the gait could not be assessed.   Extremities: no clubbing of the fingernails, no localized cyanosis, no petechial hemorrhages and no ischemic changes.   Skin: normal skin color and pigmentation, no rash, no venous stasis, no skin lesions, no skin ulcer and no xanthoma was observed.      LABS:   --------  11-16    145  |  108  |  24<H>  ----------------------------<  78  4.0   |  28  |  1.31<H>    Ca    8.8      16 Nov 2018 08:04  Mg     2.1     11-16                           11.0   7.91  )-----------( 166      ( 16 Nov 2018 08:04 )             33.5                   Radiology:    < from: US Transthoracic Echocardiogram w/Doppler Complete (11.14.18 @ 19:28) >    EXAM:  US TTE W DOPPLER COMPLETE                                  PROCEDURE DATE:  11/14/2018          INTERPRETATION:    Indication: Syncope    Technician: Trudy Philip     Study Quality: Technical difficult study, limited    Height 5 feet 8 inches, weight 170 pounds, blood pressure 146/61 mmHg    Measurements:  Aortic root size 3.07 m, left atrial size 3.8 cm, left   ventricular end-diastolic diameter 5.2 cm, left ventricular end-systolic   diameter 4.0 cm, septal wall thickness 1.1 cm, posterior wall thickness   point the 9 cm, aortic velocity 3.4 m/s,    Mitral Valve: Not well-visualized but there is mild mitral annular   calcification, thickened mitral valve with normal opening. Mild mitral   regurgitation  Aortic Valve: Not well-visualized but appears to be calcified with the   restricted cusp excursion with peak gradient 47 mmHg, mean gradient 22   mmHg with measured aortic valve area 1.2 sq cm  suggestive of moderate to   severe aortic stenosis  Left Atrium: Normal  Left Ventricle: Normal size, normal wall thickness with grossly normal   overall left ventricular systolic function endocardium was not   well-visualized. There is stage I diastolic dysfunction  Pericardium: No pericardial effusion  Tricuspid Valve: Not linear but appears to be normal with the mild   tricuspid regurgitation with estimated right ventricular pressure   measured , 20 mmHg  Pulmonic Valve: Not well Visualized  Right Ventricle: Grossly normal size with normal right ventricular   systolic function  Right Atrium: Normal size    SUMMARY:  1. normal overall left ventricular systolic function with stage I   diastolic dysfunction  2. normal right ventricular systolic function  3. moderate to severe calcific aortic stenosis, measured valve peak   gradient 47 mmHg, measured aortic valve area 1.2 sq cm.  4. Mild tricuspid regurgitation.  5. Mild mitral regurgitation  Technically somewhat limited recommend clinical correlation.                  [facility]  This document has been electronically signed. Nov 15 2018  8:12AM                < end of copied text >

## 2018-11-18 LAB
ANION GAP SERPL CALC-SCNC: 9 MMOL/L — SIGNIFICANT CHANGE UP (ref 5–17)
BUN SERPL-MCNC: 26 MG/DL — HIGH (ref 7–23)
CALCIUM SERPL-MCNC: 9 MG/DL — SIGNIFICANT CHANGE UP (ref 8.4–10.5)
CHLORIDE SERPL-SCNC: 109 MMOL/L — HIGH (ref 96–108)
CO2 SERPL-SCNC: 26 MMOL/L — SIGNIFICANT CHANGE UP (ref 22–31)
CREAT SERPL-MCNC: 1.1 MG/DL — SIGNIFICANT CHANGE UP (ref 0.5–1.3)
GLUCOSE SERPL-MCNC: 88 MG/DL — SIGNIFICANT CHANGE UP (ref 70–99)
HCT VFR BLD CALC: 35.4 % — LOW (ref 39–50)
HGB BLD-MCNC: 11.7 G/DL — LOW (ref 13–17)
MCHC RBC-ENTMCNC: 33.1 GM/DL — SIGNIFICANT CHANGE UP (ref 32–36)
MCHC RBC-ENTMCNC: 33.7 PG — SIGNIFICANT CHANGE UP (ref 27–34)
MCV RBC AUTO: 102 FL — HIGH (ref 80–100)
NRBC # BLD: 0 /100 WBCS — SIGNIFICANT CHANGE UP (ref 0–0)
PLATELET # BLD AUTO: 199 K/UL — SIGNIFICANT CHANGE UP (ref 150–400)
POTASSIUM SERPL-MCNC: 3.8 MMOL/L — SIGNIFICANT CHANGE UP (ref 3.5–5.3)
POTASSIUM SERPL-SCNC: 3.8 MMOL/L — SIGNIFICANT CHANGE UP (ref 3.5–5.3)
RBC # BLD: 3.47 M/UL — LOW (ref 4.2–5.8)
RBC # FLD: 13.6 % — SIGNIFICANT CHANGE UP (ref 10.3–14.5)
SODIUM SERPL-SCNC: 144 MMOL/L — SIGNIFICANT CHANGE UP (ref 135–145)
WBC # BLD: 8.21 K/UL — SIGNIFICANT CHANGE UP (ref 3.8–10.5)
WBC # FLD AUTO: 8.21 K/UL — SIGNIFICANT CHANGE UP (ref 3.8–10.5)

## 2018-11-18 RX ADMIN — Medication 100 MILLIGRAM(S): at 12:03

## 2018-11-18 RX ADMIN — Medication 325 MILLIGRAM(S): at 12:03

## 2018-11-18 RX ADMIN — LOSARTAN POTASSIUM 50 MILLIGRAM(S): 100 TABLET, FILM COATED ORAL at 05:22

## 2018-11-18 RX ADMIN — Medication 81 MILLIGRAM(S): at 12:03

## 2018-11-18 RX ADMIN — Medication 1 TABLET(S): at 08:25

## 2018-11-18 RX ADMIN — ENOXAPARIN SODIUM 30 MILLIGRAM(S): 100 INJECTION SUBCUTANEOUS at 12:04

## 2018-11-18 RX ADMIN — Medication 1 TABLET(S): at 12:03

## 2018-11-18 RX ADMIN — Medication 500 MILLIGRAM(S): at 21:03

## 2018-11-18 RX ADMIN — Medication 500 MILLIGRAM(S): at 05:22

## 2018-11-18 RX ADMIN — RISPERIDONE 0.5 MILLIGRAM(S): 4 TABLET ORAL at 21:03

## 2018-11-18 RX ADMIN — Medication 1 TABLET(S): at 17:36

## 2018-11-18 RX ADMIN — PANTOPRAZOLE SODIUM 40 MILLIGRAM(S): 20 TABLET, DELAYED RELEASE ORAL at 05:22

## 2018-11-18 RX ADMIN — Medication 500 MILLIGRAM(S): at 13:00

## 2018-11-18 NOTE — PROGRESS NOTE ADULT - SUBJECTIVE AND OBJECTIVE BOX
Patient is a 89y Male with a known history of :  Prophylactic measure (Z29.9)  Alzheimer's dementia with behavioral disturbance, unspecified timing of dementia onset (G30.9)  Coronary artery disease involving native coronary artery of native heart without angina pectoris (I25.10)  Urinary tract infection without hematuria, site unspecified (N39.0)  Syncope, unspecified syncope type (R55)  Altered mental status, unspecified altered mental status type (R41.82)    HPI:  89 y/o M with hx of dementia, CAD, GERD, anemia, HLD, HTN, depression, aspiration PNA, biba from [facility] assisted living for evaluation s/p episode of unresponsiveness this morning. As per aide from facility at bedside, pt was walking around this morning and ate his breakfast and then was sitting on the chair around 11am when he became unresponsive for ?few minutes, laid pt down on bed and oxygen given. Denies fall/head trauma. Pt unable to provide any history but denies pain. As per aide, altered mental status since becoming unresponsive. Responsive to verbal stimuli in ED.  afebrile in ED bp 165/55HR 64,  being admitted for AMS with h/o Cad HTn, Dementia , with syncope possible vasovagal, with possible UTi, cultures sent and started on ceftriaxone in ED. (14 Nov 2018 18:01)      REVIEW OF SYSTEMS:    CONSTITUTIONAL: No fever, weight loss, or fatigue  EYES: No eye pain, visual disturbances, or discharge  ENMT:  No difficulty hearing, tinnitus, vertigo; No sinus or throat pain  NECK: No pain or stiffness  BREASTS: No pain, masses, or nipple discharge  RESPIRATORY: No cough, wheezing, chills or hemoptysis; No shortness of breath  CARDIOVASCULAR: No chest pain, palpitations, dizziness, or leg swelling  GASTROINTESTINAL: No abdominal or epigastric pain. No nausea, vomiting, or hematemesis; No diarrhea or constipation. No melena or hematochezia.  GENITOURINARY: No dysuria, frequency, hematuria, or incontinence  NEUROLOGICAL: No headaches, memory loss, loss of strength, numbness, or tremors  SKIN: No itching, burning, rashes, or lesions   LYMPH NODES: No enlarged glands  ENDOCRINE: No heat or cold intolerance; No hair loss  MUSCULOSKELETAL: No joint pain or swelling; No muscle, back, or extremity pain  PSYCHIATRIC: No depression, anxiety, mood swings, or difficulty sleeping  HEME/LYMPH: No easy bruising, or bleeding gums  ALLERGY AND IMMUNOLOGIC: No hives or eczema    MEDICATIONS  (STANDING):  amoxicillin 500 milliGRAM(s) Oral every 8 hours  aspirin  chewable 81 milliGRAM(s) Oral daily  docusate sodium 100 milliGRAM(s) Oral daily  enoxaparin Injectable 30 milliGRAM(s) SubCutaneous daily  ferrous    sulfate 325 milliGRAM(s) Oral daily  lactobacillus acidophilus 1 Tablet(s) Oral three times a day with meals  losartan 50 milliGRAM(s) Oral daily  pantoprazole   Suspension 40 milliGRAM(s) Oral before breakfast  risperiDONE   Tablet 0.5 milliGRAM(s) Oral at bedtime    MEDICATIONS  (PRN):      ALLERGIES: No Known Allergies      FAMILY HISTORY:  No pertinent family history in first degree relatives      Social history:  Alochol:   Smoking:   Drug Use:   Marital Status:     PHYSICAL EXAMINATION:  -----------------------------  T(C): 36.6 (11-18-18 @ 09:14), Max: 36.9 (11-17-18 @ 21:19)  HR: 51 (11-18-18 @ 09:14) (42 - 64)  BP: 141/59 (11-18-18 @ 09:14) (141/59 - 189/81)  RR: 18 (11-18-18 @ 09:14) (16 - 18)  SpO2: 94% (11-18-18 @ 09:14) (92% - 98%)  Wt(kg): --    11-17 @ 07:01  -  11-18 @ 07:00  --------------------------------------------------------  IN:    IV PiggyBack: 50 mL    Oral Fluid: 500 mL  Total IN: 550 mL    OUT:  Total OUT: 0 mL    Total NET: 550 mL      11-18 @ 07:01  -  11-18 @ 10:13  --------------------------------------------------------  IN:    Oral Fluid: 420 mL  Total IN: 420 mL    OUT:  Total OUT: 0 mL    Total NET: 420 mL            Constitutional: well developed, normal appearance, well groomed, well nourished, no deformities and no acute distress.   Eyes: the conjunctiva exhibited no abnormalities and the eyelids demonstrated no xanthelasmas.   HEENT: normal oral mucosa, no oral pallor and no oral cyanosis.   Neck: normal jugular venous A waves present, normal jugular venous V waves present and no jugular venous saba A waves.   Pulmonary: no respiratory distress, normal respiratory rhythm and effort, no accessory muscle use and lungs were clear to auscultation bilaterally. Anteriorly  Cardiovascular: heart rate and rhythm were normal, normal S1 and S2 and no murmur, gallop, rub, heave or thrill are present.   Musculoskeletal: the gait could not be assessed.   Extremities: no clubbing of the fingernails, no localized cyanosis, no petechial hemorrhages and no ischemic changes.   Skin: normal skin color and pigmentation, no rash, no venous stasis, no skin lesions, no skin ulcer and no xanthoma was observed.      LABS:   --------  11-18    144  |  109<H>  |  26<H>  ----------------------------<  88  3.8   |  26  |  1.10    Ca    9.0      18 Nov 2018 07:51                           11.7   8.21  )-----------( 199      ( 18 Nov 2018 07:51 )             35.4                   Radiology:

## 2018-11-18 NOTE — PROGRESS NOTE ADULT - ASSESSMENT
89 y/o M with hx of dementia, CAD, GERD, anemia, HLD, HTN, depression, aspiration PNA, biba from [facility] assisted living for evaluation s/p episode of unresponsiveness this morning. As per aide from facility at bedside, pt was walking around this morning and ate his breakfast and then was sitting on the chair around 11am when he became unresponsive for ?few minutes, laid pt down on bed and oxygen given. Denies fall/head trauma. Pt unable to provide any history but denies pain. As per aide, altered mental status since becoming unresponsive. Responsive to verbal stimuli in ED.  afebrile in ED bp 165/55HR 64,  being admitted for AMS with h/o Cad HTn, Dementia , with syncope possible vasovagal, with possible UTi, cultures sent and started on ceftriaxone in ED.ECHO showes aortic stenosis and diastolic dysfunction , pt intermittently unresponsive, will hold and decrease resperdal, advanced care discussed with family and patient is DNR.  continue current care, leucocytosis resolved, patient is intermittently does not respond likely AMS with worsening dementia, Need help with all ADL and 2 person assist to ambulate,case d/w son  ID f up noted, cont current abx,

## 2018-11-18 NOTE — PROGRESS NOTE ADULT - SUBJECTIVE AND OBJECTIVE BOX
Patient is a 89y old  Male who presents with a chief complaint of syncope and AMs (18 Nov 2018 10:12)      INTERVAL HPI/OVERNIGHT EVENTS:    Home Medications:  aspirin 81 mg oral tablet, chewable: 1 tab(s) orally once a day (14 Nov 2018 16:15)  Colace 100 mg oral capsule: 1 cap(s) orally once a day (14 Nov 2018 16:15)  ferrous sulfate 325 mg (65 mg elemental iron) oral tablet: 1 tab(s) orally once a day (14 Nov 2018 14:48)  Klor-Con 10 10 mEq oral tablet, extended release: orally once a day (14 Nov 2018 14:48)  Lasix 40 mg oral tablet: 1 tab(s) orally once a day (14 Nov 2018 14:48)  losartan 25 mg oral tablet: 1 tab(s) orally once a day (14 Nov 2018 14:48)  Protonix 40 mg oral granule, enteric coated: 1 each orally once a day (14 Nov 2018 14:48)  Risperdal 0.5 mg oral tablet: orally 3 times a day (14 Nov 2018 14:48)  Vitamin D3 2000 intl units oral tablet: 1 tab(s) orally once a day (14 Nov 2018 14:48)      MEDICATIONS  (STANDING):  amoxicillin 500 milliGRAM(s) Oral every 8 hours  aspirin  chewable 81 milliGRAM(s) Oral daily  docusate sodium 100 milliGRAM(s) Oral daily  enoxaparin Injectable 30 milliGRAM(s) SubCutaneous daily  ferrous    sulfate 325 milliGRAM(s) Oral daily  lactobacillus acidophilus 1 Tablet(s) Oral three times a day with meals  losartan 50 milliGRAM(s) Oral daily  pantoprazole   Suspension 40 milliGRAM(s) Oral before breakfast  risperiDONE   Tablet 0.5 milliGRAM(s) Oral at bedtime    MEDICATIONS  (PRN):      Allergies    No Known Allergies    Intolerances        REVIEW OF SYSTEMS:  CONSTITUTIONAL: No fever, weight loss, or fatigue  EYES: No eye pain, visual disturbances, or discharge  ENMT:  No difficulty hearing, tinnitus, vertigo; No sinus or throat pain  NECK: No pain or stiffness  BREASTS: No pain, masses, or nipple discharge  RESPIRATORY: No cough, wheezing, chills or hemoptysis; No shortness of breath  CARDIOVASCULAR: No chest pain, palpitations, dizziness, or leg swelling  GASTROINTESTINAL: No abdominal or epigastric pain. No nausea, vomiting, or hematemesis; No diarrhea or constipation. No melena or hematochezia.  GENITOURINARY: No dysuria, frequency, hematuria, or incontinence  NEUROLOGICAL: No headaches, memory loss, loss of strength, numbness, or tremors  SKIN: No itching, burning, rashes, or lesions   LYMPH NODES: No enlarged glands  ENDOCRINE: No heat or cold intolerance; No hair loss  MUSCULOSKELETAL: No joint pain or swelling; No muscle, back, or extremity pain  PSYCHIATRIC: No depression, anxiety, mood swings, or difficulty sleeping  HEME/LYMPH: No easy bruising, or bleeding gums  ALLERGY AND IMMUNOLOGIC: No hives or eczema    Vital Signs Last 24 Hrs  T(C): 37.1 (18 Nov 2018 17:30), Max: 37.1 (18 Nov 2018 17:30)  T(F): 98.7 (18 Nov 2018 17:30), Max: 98.7 (18 Nov 2018 17:30)  HR: 60 (18 Nov 2018 17:30) (42 - 60)  BP: 159/88 (18 Nov 2018 17:30) (129/61 - 188/64)  BP(mean): --  RR: 18 (18 Nov 2018 17:30) (16 - 19)  SpO2: 94% (18 Nov 2018 17:30) (92% - 98%)    PHYSICAL EXAM:  GENERAL: NAD, well-groomed, well-developed  HEAD:  Atraumatic, Normocephalic  EYES: EOMI, PERRLA, conjunctiva and sclera clear  ENMT: Moist mucous membranes,   NECK: Supple, No JVD, Normal thyroid  NERVOUS SYSTEM:  Alert & Oriented X3, Good concentration; Motor Strength 5/5 B/L upper and lower extremities; DTRs 2+ intact and symmetric  CHEST/LUNG: Clear to percussion bilaterally; No rales, rhonchi, wheezing, or rubs  HEART: Regular rate and rhythm; No murmurs, rubs, or gallops  ABDOMEN: Soft, Nontender, Nondistended; Bowel sounds present  EXTREMITIES:  2+ Peripheral Pulses, No clubbing, cyanosis, or edema  LYMPH: No lymphadenopathy noted  SKIN: No rashes or lesions    LABS:                        11.7   8.21  )-----------( 199      ( 18 Nov 2018 07:51 )             35.4     11-18    144  |  109<H>  |  26<H>  ----------------------------<  88  3.8   |  26  |  1.10    Ca    9.0      18 Nov 2018 07:51          CAPILLARY BLOOD GLUCOSE            Culture - Urine (collected 11-14-18 @ 21:26)  Source: .Urine Clean Catch (Midstream)  Final Report (11-15-18 @ 22:33):    10,000 - 49,000 CFU/mL Streptococcus agalactiae (Group B) isolated    Group B streptococci are susceptible to ampicillin,    penicillin and cefazolin, but may be resistant to    erythromycin and clindamycin.    Recommendations for intrapartum prophylaxis for Group B    streptococci are penicillin or ampicillin.    Culture - Blood (collected 11-14-18 @ 21:18)  Source: .Blood Blood  Preliminary Report (11-15-18 @ 22:02):    No growth to date.    Culture - Blood (collected 11-14-18 @ 21:18)  Source: .Blood Blood  Preliminary Report (11-15-18 @ 22:02):    No growth to date.        I&O's Summary    17 Nov 2018 07:01  -  18 Nov 2018 07:00  --------------------------------------------------------  IN: 550 mL / OUT: 0 mL / NET: 550 mL    18 Nov 2018 07:01  -  18 Nov 2018 17:34  --------------------------------------------------------  IN: 420 mL / OUT: 0 mL / NET: 420 mL        RADIOLOGY & ADDITIONAL TESTS:    Imaging Personally Reviewed:  [ ] YES  [ ] NO    Consultant(s) Notes Reviewed:  [ ] YES  [ ] NO    Care Discussed with Consultants/Other Providers [ ] YES  [ ] NO Patient is a 89y old  Male who presents with a chief complaint of syncope and AMs (18 Nov 2018 10:12)      INTERVAL HPI/OVERNIGHT EVENTS:    Home Medications:  aspirin 81 mg oral tablet, chewable: 1 tab(s) orally once a day (14 Nov 2018 16:15)  Colace 100 mg oral capsule: 1 cap(s) orally once a day (14 Nov 2018 16:15)  ferrous sulfate 325 mg (65 mg elemental iron) oral tablet: 1 tab(s) orally once a day (14 Nov 2018 14:48)  Klor-Con 10 10 mEq oral tablet, extended release: orally once a day (14 Nov 2018 14:48)  Lasix 40 mg oral tablet: 1 tab(s) orally once a day (14 Nov 2018 14:48)  losartan 25 mg oral tablet: 1 tab(s) orally once a day (14 Nov 2018 14:48)  Protonix 40 mg oral granule, enteric coated: 1 each orally once a day (14 Nov 2018 14:48)  Risperdal 0.5 mg oral tablet: orally 3 times a day (14 Nov 2018 14:48)  Vitamin D3 2000 intl units oral tablet: 1 tab(s) orally once a day (14 Nov 2018 14:48)      MEDICATIONS  (STANDING):  amoxicillin 500 milliGRAM(s) Oral every 8 hours  aspirin  chewable 81 milliGRAM(s) Oral daily  docusate sodium 100 milliGRAM(s) Oral daily  enoxaparin Injectable 30 milliGRAM(s) SubCutaneous daily  ferrous    sulfate 325 milliGRAM(s) Oral daily  lactobacillus acidophilus 1 Tablet(s) Oral three times a day with meals  losartan 50 milliGRAM(s) Oral daily  pantoprazole   Suspension 40 milliGRAM(s) Oral before breakfast  risperiDONE   Tablet 0.5 milliGRAM(s) Oral at bedtime    MEDICATIONS  (PRN):      Allergies    No Known Allergies    Intolerances        REVIEW OF SYSTEMS:  unable as patient confused  Vital Signs Last 24 Hrs  T(C): 37.1 (18 Nov 2018 17:30), Max: 37.1 (18 Nov 2018 17:30)  T(F): 98.7 (18 Nov 2018 17:30), Max: 98.7 (18 Nov 2018 17:30)  HR: 60 (18 Nov 2018 17:30) (42 - 60)  BP: 159/88 (18 Nov 2018 17:30) (129/61 - 188/64)  BP(mean): --  RR: 18 (18 Nov 2018 17:30) (16 - 19)  SpO2: 94% (18 Nov 2018 17:30) (92% - 98%)    PHYSICAL EXAM:  GENERAL: frail alert confused  HEAD:  Atraumatic, Normocephalic  EYES: EOMI, PERRLA, conjunctiva and sclera clear  ENMT: Moist mucous membranes,   NECK: Supple, No JVD, Normal thyroid  NERVOUS SYSTEM:  confused unable to ambulate  CHEST/LUNG: Clear to percussion bilaterally; No rales, rhonchi, wheezing, or rubs  HEART: Regular rate and rhythm; No murmurs, rubs, or gallops  ABDOMEN: Soft, Nontender, Nondistended; Bowel sounds present  EXTREMITIES:  2+ Peripheral Pulses, No clubbing, cyanosis, or edema  SKIN: No rashes or lesions    LABS:                        11.7   8.21  )-----------( 199      ( 18 Nov 2018 07:51 )             35.4     11-18    144  |  109<H>  |  26<H>  ----------------------------<  88  3.8   |  26  |  1.10    Ca    9.0      18 Nov 2018 07:51          CAPILLARY BLOOD GLUCOSE            Culture - Urine (collected 11-14-18 @ 21:26)  Source: .Urine Clean Catch (Midstream)  Final Report (11-15-18 @ 22:33):    10,000 - 49,000 CFU/mL Streptococcus agalactiae (Group B) isolated    Group B streptococci are susceptible to ampicillin,    penicillin and cefazolin, but may be resistant to    erythromycin and clindamycin.    Recommendations for intrapartum prophylaxis for Group B    streptococci are penicillin or ampicillin.    Culture - Blood (collected 11-14-18 @ 21:18)  Source: .Blood Blood  Preliminary Report (11-15-18 @ 22:02):    No growth to date.    Culture - Blood (collected 11-14-18 @ 21:18)  Source: .Blood Blood  Preliminary Report (11-15-18 @ 22:02):    No growth to date.        I&O's Summary    17 Nov 2018 07:01  -  18 Nov 2018 07:00  --------------------------------------------------------  IN: 550 mL / OUT: 0 mL / NET: 550 mL    18 Nov 2018 07:01  -  18 Nov 2018 17:34  --------------------------------------------------------  IN: 420 mL / OUT: 0 mL / NET: 420 mL        RADIOLOGY & ADDITIONAL TESTS:    Imaging Personally Reviewed:  [ ] YES  [ ] NO    Consultant(s) Notes Reviewed:  [ ] YES  [ ] NO    Care Discussed with Consultants/Other Providers [ ] YES  [ ] NO

## 2018-11-18 NOTE — PROGRESS NOTE ADULT - ASSESSMENT
The patient is a 89 year old male with a history of dementia, HTN, HL, CAD, GERD, anemia who presented yesterday after an episode of unresponsiveness.    11/18/18  Patient sitting up, sleeping comfortably.  Easily arousable.  Monitor compatible with sinus bradycardia about 45/minute, APC.    Plan:  - Etiology of unresponsiveness unclear and patient remains altered  - Telemetry with sinus bradycardia, no significant pauses  - Avoid rate lowering agents  - Losartan increased for mild hypertension  - Echocardiogram with grossly normal LV systolic function, no significant valve issues  - On antibiotics  - Neurology and ID follow-up

## 2018-11-18 NOTE — PROGRESS NOTE ADULT - SUBJECTIVE AND OBJECTIVE BOX
Neurology follow up note    AGOSTINO [id number]      Interval History:    Patient awake in bed     MEDICATIONS    amoxicillin 500 milliGRAM(s) Oral every 8 hours  aspirin  chewable 81 milliGRAM(s) Oral daily  docusate sodium 100 milliGRAM(s) Oral daily  enoxaparin Injectable 30 milliGRAM(s) SubCutaneous daily  ferrous    sulfate 325 milliGRAM(s) Oral daily  lactobacillus acidophilus 1 Tablet(s) Oral three times a day with meals  losartan 50 milliGRAM(s) Oral daily  pantoprazole   Suspension 40 milliGRAM(s) Oral before breakfast  risperiDONE   Tablet 0.5 milliGRAM(s) Oral at bedtime      Allergies    No Known Allergies    Intolerances            Vital Signs Last 24 Hrs  T(C): 36.6 (18 Nov 2018 09:14), Max: 36.9 (17 Nov 2018 21:19)  T(F): 97.8 (18 Nov 2018 09:14), Max: 98.4 (17 Nov 2018 21:19)  HR: 51 (18 Nov 2018 09:14) (42 - 64)  BP: 141/59 (18 Nov 2018 09:14) (141/59 - 189/81)  BP(mean): --  RR: 18 (18 Nov 2018 09:14) (16 - 18)  SpO2: 94% (18 Nov 2018 09:14) (92% - 98%)      REVIEW OF SYSTEMS:  Limited or unable to obtain secondary to patient's poor mental status.    On Neurological Examination:    The patient is awake and alert.    Positive blink bilateral visual threat.  Pupils bilaterally 2 mm reactive to 1.    Speech, the patient would sat 1 to 2 words.  Normal baseline, he says a few words.    The patient is not following commands.    Motor:  Examination is limited.  I elevated bilateral upper extremities, was able to maintain elevated position.    Bilateral lower extremities with plantar stimulation, positive flexation at the hip and knee.   I did not notice any clear asymmetric movements.    Tone was increased bilateral upper and lower extremities.    Does not follow commands    GENERAL Exam: Nontoxic , No Acute Distress   	  HEENT:  normocephalic, atraumatic  		  LUNGS: Clear bilaterally  	  HEART: Normal S1S2   No murmur RRR        	  GI/ ABDOMEN:  Soft  Non tender    EXTREMITIES:   No Edema  No Clubbing  No Cyanosis     MUSCULOSKELETAL:  decreased Range of Motion all 4 extremities   	   SKIN: Normal  No Ecchymosis                  LABS:  CBC Full  -  ( 18 Nov 2018 07:51 )  WBC Count : 8.21 K/uL  Hemoglobin : 11.7 g/dL  Hematocrit : 35.4 %  Platelet Count - Automated : 199 K/uL  Mean Cell Volume : 102.0 fl  Mean Cell Hemoglobin : 33.7 pg  Mean Cell Hemoglobin Concentration : 33.1 gm/dL  Auto Neutrophil # : x  Auto Lymphocyte # : x  Auto Monocyte # : x  Auto Eosinophil # : x  Auto Basophil # : x  Auto Neutrophil % : x  Auto Lymphocyte % : x  Auto Monocyte % : x  Auto Eosinophil % : x  Auto Basophil % : x      11-18    144  |  109<H>  |  26<H>  ----------------------------<  88  3.8   |  26  |  1.10    Ca    9.0      18 Nov 2018 07:51      Hemoglobin A1C:       Vitamin B12         RADIOLOGY    ANALYSIS AND PLAN:  This is a 89-year-old with episode of unresponsiveness, history of dementia.  1.	For episode of unresponsiveness, this appears most likely a syncopal event.  There was no clear history documented to suggest underlying epilepsy and seizure attack, and there is no clear signs on examination to suggest a new cerebrovascular accident had ensued.  2.	I would recommend telemetry evaluation for underlying arrhythmia.  3.	Monitor systolic blood pressure.  4.	For history of dementia secondary to the patient's age and appears to be advanced, we would recommend supportive therapy.  5.	spoke to son family at [phone]  jaylin in past     Thank you for the courtesy of consultation.    Physical therapy evaluation as tolerated  OOB to chair/ambulation with assistance only if possible.    Neurologic standpoint only cleared for discharge planning     Greater than 45 minutes spent in direct patient care reviewing  the notes, lab data/ imaging , discussion with multidisciplinary team.

## 2018-11-19 LAB
CULTURE RESULTS: SIGNIFICANT CHANGE UP
CULTURE RESULTS: SIGNIFICANT CHANGE UP
SPECIMEN SOURCE: SIGNIFICANT CHANGE UP
SPECIMEN SOURCE: SIGNIFICANT CHANGE UP

## 2018-11-19 PROCEDURE — 12345: CPT | Mod: NC

## 2018-11-19 RX ORDER — LOSARTAN POTASSIUM 100 MG/1
50 TABLET, FILM COATED ORAL ONCE
Qty: 0 | Refills: 0 | Status: COMPLETED | OUTPATIENT
Start: 2018-11-19 | End: 2018-11-19

## 2018-11-19 RX ORDER — HYDRALAZINE HCL 50 MG
10 TABLET ORAL ONCE
Qty: 0 | Refills: 0 | Status: COMPLETED | OUTPATIENT
Start: 2018-11-19 | End: 2018-11-19

## 2018-11-19 RX ADMIN — PANTOPRAZOLE SODIUM 40 MILLIGRAM(S): 20 TABLET, DELAYED RELEASE ORAL at 05:06

## 2018-11-19 RX ADMIN — ENOXAPARIN SODIUM 30 MILLIGRAM(S): 100 INJECTION SUBCUTANEOUS at 11:11

## 2018-11-19 RX ADMIN — LOSARTAN POTASSIUM 50 MILLIGRAM(S): 100 TABLET, FILM COATED ORAL at 05:06

## 2018-11-19 RX ADMIN — Medication 500 MILLIGRAM(S): at 05:06

## 2018-11-19 RX ADMIN — LOSARTAN POTASSIUM 50 MILLIGRAM(S): 100 TABLET, FILM COATED ORAL at 16:13

## 2018-11-19 RX ADMIN — Medication 10 MILLIGRAM(S): at 22:28

## 2018-11-19 RX ADMIN — Medication 100 MILLIGRAM(S): at 11:10

## 2018-11-19 RX ADMIN — Medication 1 TABLET(S): at 16:58

## 2018-11-19 RX ADMIN — Medication 1 TABLET(S): at 13:06

## 2018-11-19 RX ADMIN — Medication 500 MILLIGRAM(S): at 13:06

## 2018-11-19 RX ADMIN — Medication 81 MILLIGRAM(S): at 11:10

## 2018-11-19 RX ADMIN — Medication 500 MILLIGRAM(S): at 21:35

## 2018-11-19 RX ADMIN — Medication 325 MILLIGRAM(S): at 11:12

## 2018-11-19 RX ADMIN — Medication 1 TABLET(S): at 08:45

## 2018-11-19 RX ADMIN — RISPERIDONE 0.5 MILLIGRAM(S): 4 TABLET ORAL at 21:35

## 2018-11-19 NOTE — PROVIDER CONTACT NOTE (OTHER) - BACKGROUND
admitted for UTI. Pt has been bradycardic (MD aware ) also BP high .Additional lorsatan 50mg x1 given @1630

## 2018-11-19 NOTE — PROGRESS NOTE ADULT - SUBJECTIVE AND OBJECTIVE BOX
NATIVIDAD LOMELI is a 89yMale , patient examined and chart reviewed.      INTERVAL HPI/ OVERNIGHT EVENTS:   Confused. No events.  Afebrile.    PAST MEDICAL & SURGICAL HISTORY:  Dementia  Depression  Hyperlipidemia  Dizziness  CAD (coronary artery disease)  Hypertension  No significant past surgical history    For details regarding the patient's social history, family history, and other miscellaneous elements, please refer the initial infectious diseases consultation and/or the admitting history and physical examination for this admission.    ROS:  Unable to obtain due to : confused    Current inpatient medications :    ANTIBIOTICS/RELEVANT:  amoxicillin 500 milliGRAM(s) Oral every 8 hours    MEDICATIONS  (STANDING):  aspirin  chewable 81 milliGRAM(s) Oral daily  docusate sodium 100 milliGRAM(s) Oral daily  enoxaparin Injectable 30 milliGRAM(s) SubCutaneous daily  ferrous    sulfate 325 milliGRAM(s) Oral daily  lactobacillus acidophilus 1 Tablet(s) Oral three times a day with meals  losartan 50 milliGRAM(s) Oral daily  pantoprazole   Suspension 40 milliGRAM(s) Oral before breakfast  risperiDONE   Tablet 0.5 milliGRAM(s) Oral at bedtime        Objective:  Vital Signs Last 24 Hrs  T(C): 36.8 (2018 09:10), Max: 37.1 (2018 17:30)  T(F): 98.3 (2018 09:10), Max: 98.7 (2018 17:30)  HR: 46 (2018 09:10) (44 - 60)  BP: 163/72 (2018 09:10) (129/61 - 179/68)  RR: 18 (2018 09:10) (17 - 19)  SpO2: 95% (2018 09:10) (92% - 97%)    Physical Exam:  General: no acute distress awake confused  Eyes: sclera anicteric, pupils equal and reactive to light  ENMT: buccal mucosa moist, pharynx not injected  Neck: supple, trachea midline  Lungs: clear, no wheeze/rhonchi  Cardiovascular: regular rate and rhythm, S1 S2  Abdomen: soft, nontender, no organomegaly present, bowel sounds normal  Neurological: alert and oriented x0, Cranial Nerves II-XII grossly intact  Skin: no increased ecchymosis/petechiae/purpura  Lymph Nodes: no palpable cervical/supraclavicular lymph nodes enlargements  Extremities: no cyanosis/clubbing/edema    LABS:                        11.7   8.21  )-----------( 199      ( 2018 07:51 )             35.4   11-18    144  |  109<H>  |  26<H>  ----------------------------<  88  3.8   |  26  |  1.10    Ca    9.0      2018 07:51      Urinalysis Basic - ( 2018 15:31 )    Color: Yellow / Appearance: Clear / S.010 / pH: x  Gluc: x / Ketone: Negative  / Bili: Negative / Urobili: Negative mg/dL   Blood: x / Protein: Negative mg/dL / Nitrite: Negative   Leuk Esterase: Small / RBC: 6-10 /HPF / WBC 11-25   Sq Epi: x / Non Sq Epi: x / Bacteria: Few      MICROBIOLOGY:    Culture - Urine (collected 2018 21:26)  Source: .Urine Clean Catch (Midstream)  Final Report (15 Nov 2018 22:33):    10,000 - 49,000 CFU/mL Streptococcus agalactiae (Group B) isolated    Group B streptococci are susceptible to ampicillin,    penicillin and cefazolin, but may be resistant to    erythromycin and clindamycin.    Recommendations for intrapartum prophylaxis for Group B    streptococci are penicillin or ampicillin.    Culture - Blood (collected 2018 21:18)  Source: .Blood Blood  Preliminary Report (15 Nov 2018 22:02):    No growth to date.    Culture - Blood (collected 2018 21:18)  Source: .Blood Blood  Preliminary Report (15 Nov 2018 22:02):    No growth to date.    RADIOLOGY & ADDITIONAL STUDIES:  EXAM:  XR CHEST PORTABLE IMMED 1V                            PROCEDURE DATE:  2018        INTERPRETATION:    DATE OF STUDY: 18.    PRIOR: 18.    CLINICAL INDICATION: 89-yo-male - syncope; unresponsive. Assess for chest   process.      TECHNIQUE: portable chest.    FINDINGS:   The study is limited by low lung volumes.  Thoracic aortic atheromatous changes and ectasia.  The heart is magnified by technique.  No acute congestive changes.  Trace left pleural effusion seen with associated left basal atelectasis.  No right lung focal consolidation.  No pneumothorax.  There are degenerative changes of the thoracic spine.    IMPRESSION:   Trace left pleural effusion has developed since 18 exam - with   associated left basilar atelectasis.  Right lung clear.    Assessment :  89 y/o M with hx of dementia, CAD, GERD, anemia, HLD, HTN, depression, aspiration PNA, biba   from [facility] with CC of episode of unresponsiveness.   UTI  Overall stable    Plan :   Off Rocephin   Complete course of  Amoxicillin  Asp precautions  Dc planning    D/w Dr Reich    Continue with present regiment.  Appropriate use of antibiotics and adverse effects reviewed.      I have discussed the above plan of care with patient/ family in detail. They expressed understanding of the  treatment plan . Risks, benefits and alternatives discussed in detail. I have asked if they have any questions or concerns and appropriately addressed them to the best of my ability .    > 25 minutes were spent in direct patient care reviewing notes, medications ,labs data/ imaging , discussion with multidisciplinary team.    Thank you for allowing me to participate in care of your patient .    Andrea Lam MD  Infectious Disease  [phone]

## 2018-11-19 NOTE — PROGRESS NOTE ADULT - SUBJECTIVE AND OBJECTIVE BOX
Patient is a 89y old  Male who presents with a chief complaint of syncope and AMs (18 Nov 2018 17:34)      INTERVAL HPI/OVERNIGHT EVENTS:no acute event overnight    Home Medications:  aspirin 81 mg oral tablet, chewable: 1 tab(s) orally once a day (14 Nov 2018 16:15)  Colace 100 mg oral capsule: 1 cap(s) orally once a day (14 Nov 2018 16:15)  ferrous sulfate 325 mg (65 mg elemental iron) oral tablet: 1 tab(s) orally once a day (14 Nov 2018 14:48)  Klor-Con 10 10 mEq oral tablet, extended release: orally once a day (14 Nov 2018 14:48)  Lasix 40 mg oral tablet: 1 tab(s) orally once a day (14 Nov 2018 14:48)  losartan 25 mg oral tablet: 1 tab(s) orally once a day (14 Nov 2018 14:48)  Protonix 40 mg oral granule, enteric coated: 1 each orally once a day (14 Nov 2018 14:48)  Risperdal 0.5 mg oral tablet: orally 3 times a day (14 Nov 2018 14:48)  Vitamin D3 2000 intl units oral tablet: 1 tab(s) orally once a day (14 Nov 2018 14:48)      MEDICATIONS  (STANDING):  amoxicillin 500 milliGRAM(s) Oral every 8 hours  aspirin  chewable 81 milliGRAM(s) Oral daily  docusate sodium 100 milliGRAM(s) Oral daily  enoxaparin Injectable 30 milliGRAM(s) SubCutaneous daily  ferrous    sulfate 325 milliGRAM(s) Oral daily  lactobacillus acidophilus 1 Tablet(s) Oral three times a day with meals  losartan 50 milliGRAM(s) Oral daily  pantoprazole   Suspension 40 milliGRAM(s) Oral before breakfast  risperiDONE   Tablet 0.5 milliGRAM(s) Oral at bedtime    MEDICATIONS  (PRN):      Allergies    No Known Allergies    Intolerances        REVIEW OF SYSTEMS:  unable as confused    Vital Signs Last 24 Hrs  T(C): 36.8 (19 Nov 2018 09:10), Max: 37.1 (18 Nov 2018 17:30)  T(F): 98.3 (19 Nov 2018 09:10), Max: 98.7 (18 Nov 2018 17:30)  HR: 46 (19 Nov 2018 09:10) (44 - 60)  BP: 163/72 (19 Nov 2018 09:10) (129/61 - 179/68)  BP(mean): --  RR: 18 (19 Nov 2018 09:10) (17 - 19)  SpO2: 95% (19 Nov 2018 09:10) (92% - 97%)    PHYSICAL EXAM:  GENERAL: frail  HEAD:  Atraumatic, Normocephalic  EYES: EOMI, PERRLA, conjunctiva and sclera clear  ENMT: Moist mucous membranes,   NECK: Supple, No JVD, Normal thyroid  NERVOUS SYSTEM:  confused, unable to ambulated , needs help with all ADL  CHEST/LUNG: Clear to percussion bilaterally; No rales, rhonchi, wheezing, or rubs  HEART: Regular rate and rhythm; No murmurs, rubs, or gallops  ABDOMEN: Soft, Nontender, Nondistended; Bowel sounds present  EXTREMITIES:  2+ Peripheral Pulses, No clubbing, cyanosis, or edema  SKIN: No rashes or lesions    LABS:                        11.7   8.21  )-----------( 199      ( 18 Nov 2018 07:51 )             35.4     11-18    144  |  109<H>  |  26<H>  ----------------------------<  88  3.8   |  26  |  1.10    Ca    9.0      18 Nov 2018 07:51          CAPILLARY BLOOD GLUCOSE            Culture - Urine (collected 11-14-18 @ 21:26)  Source: .Urine Clean Catch (Midstream)  Final Report (11-15-18 @ 22:33):    10,000 - 49,000 CFU/mL Streptococcus agalactiae (Group B) isolated    Group B streptococci are susceptible to ampicillin,    penicillin and cefazolin, but may be resistant to    erythromycin and clindamycin.    Recommendations for intrapartum prophylaxis for Group B    streptococci are penicillin or ampicillin.    Culture - Blood (collected 11-14-18 @ 21:18)  Source: .Blood Blood  Preliminary Report (11-15-18 @ 22:02):    No growth to date.    Culture - Blood (collected 11-14-18 @ 21:18)  Source: .Blood Blood  Preliminary Report (11-15-18 @ 22:02):    No growth to date.        I&O's Summary    18 Nov 2018 07:01  -  19 Nov 2018 07:00  --------------------------------------------------------  IN: 820 mL / OUT: 0 mL / NET: 820 mL        RADIOLOGY & ADDITIONAL TESTS:    Imaging Personally Reviewed:  [x ] YES  [ ] NO    Consultant(s) Notes Reviewed:  [x ] YES  [ ] NO    Care Discussed with Consultants/Other Providers [x ] YES  [ ] NO

## 2018-11-19 NOTE — PROGRESS NOTE ADULT - ASSESSMENT
The patient is a 89 year old male with a history of dementia, HTN, HL, CAD, GERD, anemia who presented yesterday after an episode of unresponsiveness.    Plan:  - Etiology of unresponsiveness unclear and patient remains altered  - Telemetry with sinus bradycardia, no significant pauses  - Avoid rate lowering agents  - Echocardiogram with grossly normal LV systolic function, no significant valve issues  - Continue losartan 50 mg daily  - Discharge planning

## 2018-11-19 NOTE — PROGRESS NOTE ADULT - SUBJECTIVE AND OBJECTIVE BOX
Chief Complaint: Syncope, AMS    Interval Events: No events overnight.                        11.7   8.21  )-----------( 199      ( 18 Nov 2018 07:51 )             35.4     Review of Systems:  Unable to obtain    Physical Exam:  Vital Signs Last 24 Hrs  T(C): 36.8 (19 Nov 2018 09:10), Max: 37.1 (18 Nov 2018 17:30)  T(F): 98.3 (19 Nov 2018 09:10), Max: 98.7 (18 Nov 2018 17:30)  HR: 46 (19 Nov 2018 09:10) (44 - 60)  BP: 163/72 (19 Nov 2018 09:10) (129/61 - 179/68)  BP(mean): --  RR: 18 (19 Nov 2018 09:10) (17 - 19)  SpO2: 95% (19 Nov 2018 09:10) (92% - 97%)  General: NAD  HEENT: MMM  Neck: No JVD, no carotid bruit  Lungs: CTAB  CV: RRR, nl S1/S2, no M/R/G  Abdomen: S/NT/ND, +BS  Extremities: No LE edema, no cyanosis  Neuro: AAOx3, non-focal  Skin: No rash    Labs:             11-18    144  |  109<H>  |  26<H>  ----------------------------<  88  3.8   |  26  |  1.10    Ca    9.0      18 Nov 2018 07:51        Telemetry: Sinus bradycardia to 30s while sleeping

## 2018-11-19 NOTE — PROGRESS NOTE ADULT - ASSESSMENT
89 y/o M with hx of dementia, CAD, GERD, anemia, HLD, HTN, depression, aspiration PNA, biba from [facility] assisted living for evaluation s/p episode of unresponsiveness this morning. As per aide from facility at bedside, pt was walking around this morning and ate his breakfast and then was sitting on the chair around 11am when he became unresponsive for ?few minutes, laid pt down on bed and oxygen given. Denies fall/head trauma. Pt unable to provide any history but denies pain. As per aide, altered mental status since becoming unresponsive. Responsive to verbal stimuli in ED.  afebrile in ED bp 165/55HR 64,  being admitted for AMS with h/o Cad HTn, Dementia , with syncope possible vasovagal, with possible UTi, cultures sent and started on ceftriaxone in ED.ECHO showes aortic stenosis and diastolic dysfunction , pt intermittently unresponsive, will hold and decrease resperdal, advanced care discussed with family and patient is DNR.  continue current care, leucocytosis resolved, patient is intermittently does not respond likely AMS with worsening dementia, Need help with all ADL and 2 person assist to ambulate,case d/w son  ID f up noted, Antibiotics changed to PO   pt eval and DC plan .

## 2018-11-19 NOTE — PROGRESS NOTE ADULT - SUBJECTIVE AND OBJECTIVE BOX
Neurology follow up note    AGOSTINO [id number]      Interval History:    Patient awake in bed     MEDICATIONS    amoxicillin 500 milliGRAM(s) Oral every 8 hours  aspirin  chewable 81 milliGRAM(s) Oral daily  docusate sodium 100 milliGRAM(s) Oral daily  enoxaparin Injectable 30 milliGRAM(s) SubCutaneous daily  ferrous    sulfate 325 milliGRAM(s) Oral daily  lactobacillus acidophilus 1 Tablet(s) Oral three times a day with meals  losartan 50 milliGRAM(s) Oral daily  pantoprazole   Suspension 40 milliGRAM(s) Oral before breakfast  risperiDONE   Tablet 0.5 milliGRAM(s) Oral at bedtime      Allergies    No Known Allergies    Intolerances            Vital Signs Last 24 Hrs  T(C): 36.8 (19 Nov 2018 09:10), Max: 37.1 (18 Nov 2018 17:30)  T(F): 98.3 (19 Nov 2018 09:10), Max: 98.7 (18 Nov 2018 17:30)  HR: 46 (19 Nov 2018 09:10) (44 - 60)  BP: 163/72 (19 Nov 2018 09:10) (129/61 - 179/68)  BP(mean): --  RR: 18 (19 Nov 2018 09:10) (17 - 19)  SpO2: 95% (19 Nov 2018 09:10) (92% - 97%)        REVIEW OF SYSTEMS:  Limited or unable to obtain secondary to patient's poor mental status.    On Neurological Examination:    The patient is awake and alert.    Positive blink bilateral visual threat.  Pupils bilaterally 2 mm reactive to 1.    Speech, the patient would sat 1 to 2 words.  Normal baseline, he says a few words.    The patient is not following commands.    Motor:  Examination is limited.  I elevated bilateral upper extremities, was able to maintain elevated position.    Bilateral lower extremities with plantar stimulation, positive flexation at the hip and knee.   I did not notice any clear asymmetric movements.    Tone was increased bilateral upper and lower extremities.    Does not follow commands    GENERAL Exam: Nontoxic , No Acute Distress   	  HEENT:  normocephalic, atraumatic  		  LUNGS: Clear bilaterally  	  HEART: Normal S1S2   No murmur RRR        	  GI/ ABDOMEN:  Soft  Non tender    EXTREMITIES:   No Edema  No Clubbing  No Cyanosis     MUSCULOSKELETAL:  decreased Range of Motion all 4 extremities   	   SKIN: Normal  No Ecchymosis                     LABS:  CBC Full  -  ( 18 Nov 2018 07:51 )  WBC Count : 8.21 K/uL  Hemoglobin : 11.7 g/dL  Hematocrit : 35.4 %  Platelet Count - Automated : 199 K/uL  Mean Cell Volume : 102.0 fl  Mean Cell Hemoglobin : 33.7 pg  Mean Cell Hemoglobin Concentration : 33.1 gm/dL  Auto Neutrophil # : x  Auto Lymphocyte # : x  Auto Monocyte # : x  Auto Eosinophil # : x  Auto Basophil # : x  Auto Neutrophil % : x  Auto Lymphocyte % : x  Auto Monocyte % : x  Auto Eosinophil % : x  Auto Basophil % : x      11-18    144  |  109<H>  |  26<H>  ----------------------------<  88  3.8   |  26  |  1.10    Ca    9.0      18 Nov 2018 07:51      Hemoglobin A1C:       Vitamin B12         RADIOLOGY    ANALYSIS AND PLAN:  This is a 89-year-old with episode of unresponsiveness, history of dementia.  1.	For episode of unresponsiveness, this appears most likely a syncopal event.  There was no clear history documented to suggest underlying epilepsy and seizure attack, and there is no clear signs on examination to suggest a new cerebrovascular accident had ensued.  2.	I would recommend telemetry evaluation for underlying arrhythmia.  3.	Monitor systolic blood pressure.  4.	For history of dementia secondary to the patient's age and appears to be advanced, we would recommend supportive therapy.  5.	spoke to son family at [phone]  jaylin in past     Thank you for the courtesy of consultation.    Physical therapy evaluation as tolerated  OOB to chair/ambulation with assistance only if possible.    Neurologic standpoint only cleared for discharge planning     Greater than 40 minutes spent in direct patient care reviewing  the notes, lab data/ imaging , discussion with multidisciplinary team.

## 2018-11-20 LAB
ANION GAP SERPL CALC-SCNC: 10 MMOL/L — SIGNIFICANT CHANGE UP (ref 5–17)
BUN SERPL-MCNC: 32 MG/DL — HIGH (ref 7–23)
CALCIUM SERPL-MCNC: 9.1 MG/DL — SIGNIFICANT CHANGE UP (ref 8.4–10.5)
CHLORIDE SERPL-SCNC: 109 MMOL/L — HIGH (ref 96–108)
CO2 SERPL-SCNC: 26 MMOL/L — SIGNIFICANT CHANGE UP (ref 22–31)
CREAT SERPL-MCNC: 1.08 MG/DL — SIGNIFICANT CHANGE UP (ref 0.5–1.3)
GLUCOSE SERPL-MCNC: 97 MG/DL — SIGNIFICANT CHANGE UP (ref 70–99)
HCT VFR BLD CALC: 36.4 % — LOW (ref 39–50)
HGB BLD-MCNC: 12.4 G/DL — LOW (ref 13–17)
MCHC RBC-ENTMCNC: 33.8 PG — SIGNIFICANT CHANGE UP (ref 27–34)
MCHC RBC-ENTMCNC: 34.1 GM/DL — SIGNIFICANT CHANGE UP (ref 32–36)
MCV RBC AUTO: 99.2 FL — SIGNIFICANT CHANGE UP (ref 80–100)
NRBC # BLD: 0 /100 WBCS — SIGNIFICANT CHANGE UP (ref 0–0)
PLATELET # BLD AUTO: 206 K/UL — SIGNIFICANT CHANGE UP (ref 150–400)
POTASSIUM SERPL-MCNC: 3.9 MMOL/L — SIGNIFICANT CHANGE UP (ref 3.5–5.3)
POTASSIUM SERPL-SCNC: 3.9 MMOL/L — SIGNIFICANT CHANGE UP (ref 3.5–5.3)
RBC # BLD: 3.67 M/UL — LOW (ref 4.2–5.8)
RBC # FLD: 13.3 % — SIGNIFICANT CHANGE UP (ref 10.3–14.5)
SODIUM SERPL-SCNC: 145 MMOL/L — SIGNIFICANT CHANGE UP (ref 135–145)
WBC # BLD: 7.56 K/UL — SIGNIFICANT CHANGE UP (ref 3.8–10.5)
WBC # FLD AUTO: 7.56 K/UL — SIGNIFICANT CHANGE UP (ref 3.8–10.5)

## 2018-11-20 RX ORDER — AMLODIPINE BESYLATE 2.5 MG/1
5 TABLET ORAL DAILY
Qty: 0 | Refills: 0 | Status: DISCONTINUED | OUTPATIENT
Start: 2018-11-20 | End: 2018-11-21

## 2018-11-20 RX ADMIN — PANTOPRAZOLE SODIUM 40 MILLIGRAM(S): 20 TABLET, DELAYED RELEASE ORAL at 06:11

## 2018-11-20 RX ADMIN — Medication 100 MILLIGRAM(S): at 11:27

## 2018-11-20 RX ADMIN — Medication 1 TABLET(S): at 13:01

## 2018-11-20 RX ADMIN — Medication 500 MILLIGRAM(S): at 13:01

## 2018-11-20 RX ADMIN — Medication 325 MILLIGRAM(S): at 11:27

## 2018-11-20 RX ADMIN — AMLODIPINE BESYLATE 5 MILLIGRAM(S): 2.5 TABLET ORAL at 09:28

## 2018-11-20 RX ADMIN — ENOXAPARIN SODIUM 30 MILLIGRAM(S): 100 INJECTION SUBCUTANEOUS at 11:28

## 2018-11-20 RX ADMIN — Medication 500 MILLIGRAM(S): at 05:54

## 2018-11-20 RX ADMIN — Medication 81 MILLIGRAM(S): at 11:27

## 2018-11-20 RX ADMIN — Medication 500 MILLIGRAM(S): at 21:13

## 2018-11-20 RX ADMIN — Medication 1 TABLET(S): at 08:20

## 2018-11-20 RX ADMIN — LOSARTAN POTASSIUM 50 MILLIGRAM(S): 100 TABLET, FILM COATED ORAL at 05:54

## 2018-11-20 RX ADMIN — Medication 1 TABLET(S): at 17:28

## 2018-11-20 RX ADMIN — RISPERIDONE 0.5 MILLIGRAM(S): 4 TABLET ORAL at 21:13

## 2018-11-20 NOTE — PROGRESS NOTE ADULT - SUBJECTIVE AND OBJECTIVE BOX
Patient is a 89y old  Male who presents with a chief complaint of syncope and AMs (20 Nov 2018 09:12)      INTERVAL HPI/OVERNIGHT EVENTS:no acute event overnight    Home Medications:  aspirin 81 mg oral tablet, chewable: 1 tab(s) orally once a day (14 Nov 2018 16:15)  Colace 100 mg oral capsule: 1 cap(s) orally once a day (14 Nov 2018 16:15)  ferrous sulfate 325 mg (65 mg elemental iron) oral tablet: 1 tab(s) orally once a day (14 Nov 2018 14:48)  Klor-Con 10 10 mEq oral tablet, extended release: orally once a day (14 Nov 2018 14:48)  Lasix 40 mg oral tablet: 1 tab(s) orally once a day (14 Nov 2018 14:48)  losartan 25 mg oral tablet: 1 tab(s) orally once a day (14 Nov 2018 14:48)  Protonix 40 mg oral granule, enteric coated: 1 each orally once a day (14 Nov 2018 14:48)  Risperdal 0.5 mg oral tablet: orally 3 times a day (14 Nov 2018 14:48)  Vitamin D3 2000 intl units oral tablet: 1 tab(s) orally once a day (14 Nov 2018 14:48)      MEDICATIONS  (STANDING):  amLODIPine   Tablet 5 milliGRAM(s) Oral daily  amoxicillin 500 milliGRAM(s) Oral every 8 hours  aspirin  chewable 81 milliGRAM(s) Oral daily  docusate sodium 100 milliGRAM(s) Oral daily  enoxaparin Injectable 30 milliGRAM(s) SubCutaneous daily  ferrous    sulfate 325 milliGRAM(s) Oral daily  lactobacillus acidophilus 1 Tablet(s) Oral three times a day with meals  losartan 50 milliGRAM(s) Oral daily  pantoprazole   Suspension 40 milliGRAM(s) Oral before breakfast  risperiDONE   Tablet 0.5 milliGRAM(s) Oral at bedtime    MEDICATIONS  (PRN):      Allergies    No Known Allergies    Intolerances        REVIEW OF SYSTEMS:  unable as confused  Vital Signs Last 24 Hrs  T(C): 36.8 (20 Nov 2018 09:17), Max: 37 (19 Nov 2018 17:07)  T(F): 98.2 (20 Nov 2018 09:17), Max: 98.6 (19 Nov 2018 17:07)  HR: 76 (20 Nov 2018 09:17) (46 - 76)  BP: 171/74 (20 Nov 2018 09:17) (151/67 - 195/71)  BP(mean): --  RR: 18 (20 Nov 2018 09:17) (18 - 18)  SpO2: 98% (20 Nov 2018 09:17) (95% - 98%)    PHYSICAL EXAM:  GENERAL: well-groomed, well-developed  HEAD:  Atraumatic, Normocephalic  EYES: EOMI, PERRLA, conjunctiva and sclera clear  ENMT: Moist mucous membranes,   NECK: Supple, No JVD, Normal thyroid  NERVOUS SYSTEM: confused does not follow commands, unable to ambulate.  CHEST/LUNG: Clear to percussion bilaterally; No rales, rhonchi, wheezing, or rubs  HEART: Regular rate and rhythm; No murmurs, rubs, or gallops  ABDOMEN: Soft, Nontender, Nondistended; Bowel sounds present  EXTREMITIES:  2+ Peripheral Pulses, No clubbing, cyanosis, or edema  SKIN: No rashes or lesions    LABS:                        12.4   7.56  )-----------( 206      ( 20 Nov 2018 07:51 )             36.4     11-20    145  |  109<H>  |  32<H>  ----------------------------<  97  3.9   |  26  |  1.08    Ca    9.1      20 Nov 2018 07:51          CAPILLARY BLOOD GLUCOSE            Culture - Urine (collected 11-14-18 @ 21:26)  Source: .Urine Clean Catch (Midstream)  Final Report (11-15-18 @ 22:33):    10,000 - 49,000 CFU/mL Streptococcus agalactiae (Group B) isolated    Group B streptococci are susceptible to ampicillin,    penicillin and cefazolin, but may be resistant to    erythromycin and clindamycin.    Recommendations for intrapartum prophylaxis for Group B    streptococci are penicillin or ampicillin.    Culture - Blood (collected 11-14-18 @ 21:18)  Source: .Blood Blood  Final Report (11-19-18 @ 22:01):    No growth at 5 days.    Culture - Blood (collected 11-14-18 @ 21:18)  Source: .Blood Blood  Final Report (11-19-18 @ 22:01):    No growth at 5 days.        I&O's Summary    19 Nov 2018 07:01  -  20 Nov 2018 07:00  --------------------------------------------------------  IN: 1060 mL / OUT: 0 mL / NET: 1060 mL    20 Nov 2018 07:01  -  20 Nov 2018 11:53  --------------------------------------------------------  IN: 420 mL / OUT: 0 mL / NET: 420 mL        RADIOLOGY & ADDITIONAL TESTS:    Imaging Personally Reviewed:  [x ] YES  [ ] NO    Consultant(s) Notes Reviewed:  [x ] YES  [ ] NO    Care Discussed with Consultants/Other Providers [ x] YES  [ ] NO

## 2018-11-20 NOTE — PROGRESS NOTE ADULT - SUBJECTIVE AND OBJECTIVE BOX
Neurology follow up note    AGOSTINO [id number]      Interval History:    REVIEW OF SYSTEMS:  Limited or unable to obtain secondary to patient's poor mental status.    On Neurological Examination:    The patient is awake and alert.    Positive blink bilateral visual threat.  Pupils bilaterally 2 mm reactive to 1.    Speech, the patient would sat 1 to 2 words.  Normal baseline, he says a few words.    The patient is not following commands.    Motor:  Examination is limited.  I elevated bilateral upper extremities, was able to maintain elevated position.    Bilateral lower extremities with plantar stimulation, positive flexation at the hip and knee.   I did not notice any clear asymmetric movements.    Tone was increased bilateral upper and lower extremities.    Does not follow commands    GENERAL Exam: Nontoxic , No Acute Distress   	  HEENT:  normocephalic, atraumatic  		  LUNGS: Clear bilaterally  	  HEART: Normal S1S2   No murmur RRR        	  GI/ ABDOMEN:  Soft  Non tender    EXTREMITIES:   No Edema  No Clubbing  No Cyanosis     MUSCULOSKELETAL:  decreased Range of Motion all 4 extremities   	   SKIN: Normal  No Ecchymosis        MEDICATIONS    amLODIPine   Tablet 5 milliGRAM(s) Oral daily  amoxicillin 500 milliGRAM(s) Oral every 8 hours  aspirin  chewable 81 milliGRAM(s) Oral daily  docusate sodium 100 milliGRAM(s) Oral daily  enoxaparin Injectable 30 milliGRAM(s) SubCutaneous daily  ferrous    sulfate 325 milliGRAM(s) Oral daily  lactobacillus acidophilus 1 Tablet(s) Oral three times a day with meals  losartan 50 milliGRAM(s) Oral daily  pantoprazole   Suspension 40 milliGRAM(s) Oral before breakfast  risperiDONE   Tablet 0.5 milliGRAM(s) Oral at bedtime      Allergies    No Known Allergies    Intolerances            Vital Signs Last 24 Hrs  T(C): 36.4 (20 Nov 2018 00:07), Max: 37 (19 Nov 2018 17:07)  T(F): 97.6 (20 Nov 2018 00:07), Max: 98.6 (19 Nov 2018 17:07)  HR: 54 (20 Nov 2018 00:07) (46 - 72)  BP: 151/67 (20 Nov 2018 00:07) (151/67 - 195/71)  BP(mean): --  RR: 18 (20 Nov 2018 00:07) (18 - 18)  SpO2: 97% (20 Nov 2018 00:07) (95% - 97%)                  LABS:  CBC Full  -  ( 20 Nov 2018 07:51 )  WBC Count : 7.56 K/uL  Hemoglobin : 12.4 g/dL  Hematocrit : 36.4 %  Platelet Count - Automated : 206 K/uL  Mean Cell Volume : 99.2 fl  Mean Cell Hemoglobin : 33.8 pg  Mean Cell Hemoglobin Concentration : 34.1 gm/dL  Auto Neutrophil # : x  Auto Lymphocyte # : x  Auto Monocyte # : x  Auto Eosinophil # : x  Auto Basophil # : x  Auto Neutrophil % : x  Auto Lymphocyte % : x  Auto Monocyte % : x  Auto Eosinophil % : x  Auto Basophil % : x      11-20    145  |  109<H>  |  32<H>  ----------------------------<  97  3.9   |  26  |  1.08    Ca    9.1      20 Nov 2018 07:51      Hemoglobin A1C:       Vitamin B12         RADIOLOGY      ANALYSIS AND PLAN:  This is a 89-year-old with episode of unresponsiveness, history of dementia.  1.	For episode of unresponsiveness, this appears most likely a syncopal event.  There was no clear history documented to suggest underlying epilepsy and seizure attack, and there is no clear signs on examination to suggest a new cerebrovascular accident had ensued.  2.	I would recommend telemetry evaluation for underlying arrhythmia.  3.	Monitor systolic blood pressure adjust medications as needed   4.	For history of dementia secondary to the patient's age and appears to be advanced, we would recommend supportive therapy.  5.	spoke to son family at [phone]  jaylin in past     Thank you for the courtesy of consultation.    Physical therapy evaluation as tolerated  OOB to chair/ambulation with assistance only if possible.    Neurologic standpoint only cleared for discharge planning     Greater than 40 minutes spent in direct patient care reviewing  the notes, lab data/ imaging , discussion with multidisciplinary team.

## 2018-11-20 NOTE — SWALLOW BEDSIDE ASSESSMENT ADULT - SWALLOW EVAL: RECOMMENDED FEEDING/EATING TECHNIQUES
alternate food with liquid/position upright (90 degrees)/maintain upright posture during/after eating for 30 mins/small sips/bites

## 2018-11-20 NOTE — PROGRESS NOTE ADULT - SUBJECTIVE AND OBJECTIVE BOX
Chief Complaint: AMS    Interval Events: Hypertensive and agitated yesterday    Review of Systems:  General: No fevers, chills, weight loss or gain  Skin: No rashes, color changes  Cardiovascular: No chest pain, orthopnea  Respiratory: No shortness of breath, cough  Gastrointestinal: No nausea, abdominal pain  Genitourinary: No incontinence, pain with urination  Musculoskeletal: No pain, swelling, decreased range of motion  Neurological: No headache, weakness  Psychiatric: No depression, anxiety  Endocrine: No weight loss or gain, increased thirst  All other systems are comprehensively negative.    Physical Exam:  Vitals:        Vital Signs Last 24 Hrs  T(C): 36.4 (20 Nov 2018 00:07), Max: 37 (19 Nov 2018 17:07)  T(F): 97.6 (20 Nov 2018 00:07), Max: 98.6 (19 Nov 2018 17:07)  HR: 54 (20 Nov 2018 00:07) (46 - 72)  BP: 151/67 (20 Nov 2018 00:07) (151/67 - 195/71)  BP(mean): --  RR: 18 (20 Nov 2018 00:07) (18 - 18)  SpO2: 97% (20 Nov 2018 00:07) (95% - 97%)  General: NAD  HEENT: MMM  Neck: No JVD, no carotid bruit  Lungs: CTAB  CV: RRR, nl S1/S2, no M/R/G  Abdomen: S/NT/ND, +BS  Extremities: No LE edema, no cyanosis  Neuro: AAOx3, non-focal  Skin: No rash    Labs:                        12.4   7.56  )-----------( 206      ( 20 Nov 2018 07:51 )             36.4                   Telemetry: Sinus bradycardia, short pauses

## 2018-11-20 NOTE — PROGRESS NOTE ADULT - ASSESSMENT
The patient is a 89 year old male with a history of dementia, HTN, HL, CAD, GERD, anemia who presented yesterday after an episode of unresponsiveness.    Plan:  - Telemetry with sinus bradycardia, no significant pauses  - Avoid rate lowering agents  - Echocardiogram with grossly normal LV systolic function, no significant valve issues  - Continue losartan 50 mg daily  - Add amlodipine 5 mg daily  - Hypertension in part due to agitation while checking BPs  - Discharge planning

## 2018-11-20 NOTE — SWALLOW BEDSIDE ASSESSMENT ADULT - COMMENTS
The patient is a 89 year old male, A&Ox1 with confusion. The patient is currently on a mechanical soft diet texture with thin liquids. The patient presents with an edentulous dental status, unable to fully assess oral motor functional secondary to cognitive status. The patient trailed puree and mechanical soft textures with thin liquids. Adequate oral prep, latent transport with functional but extended mastication of mechanical soft textures secondary to dentition, +timely swallow trigger with hyolaryngeal excursion. No overt s/s penetration/aspiration noted.

## 2018-11-20 NOTE — PROGRESS NOTE ADULT - ASSESSMENT
89 y/o M with hx of dementia, CAD, GERD, anemia, HLD, HTN, depression, aspiration PNA, biba from [facility] assisted living for evaluation s/p episode of unresponsiveness this morning. As per aide from facility at bedside, pt was walking around this morning and ate his breakfast and then was sitting on the chair around 11am when he became unresponsive for ?few minutes, laid pt down on bed and oxygen given. Denies fall/head trauma. Pt unable to provide any history but denies pain. As per aide, altered mental status since becoming unresponsive. Responsive to verbal stimuli in ED.  afebrile in ED bp 165/55HR 64,  being admitted for AMS with h/o Cad HTn, Dementia , with syncope possible vasovagal, with possible UTi, cultures sent and started on ceftriaxone in ED.ECHO showes aortic stenosis and diastolic dysfunction , pt intermittently unresponsive, will hold and decrease resperdal, advanced care discussed with family and patient is DNR.  continue current care, leucocytosis resolved, patient is intermittently does not respond likely AMS with worsening dementia, Need help with all ADL and 2 person assist to ambulate,case d/w son  ID f up noted, Antibiotics changed to PO   pt eval and DC plan to rehab in progress.

## 2018-11-20 NOTE — PROGRESS NOTE ADULT - SUBJECTIVE AND OBJECTIVE BOX
DARINELWALLACENATIVIDAD WEBB is a 89yMale , patient examined and chart reviewed.      INTERVAL HPI/ OVERNIGHT EVENTS:   Confused. No events. Eating lunch with good appetite.  Afebrile.    PAST MEDICAL & SURGICAL HISTORY:  Dementia  Depression  Hyperlipidemia  Dizziness  CAD (coronary artery disease)  Hypertension  No significant past surgical history    For details regarding the patient's social history, family history, and other miscellaneous elements, please refer the initial infectious diseases consultation and/or the admitting history and physical examination for this admission.    ROS:  Unable to obtain due to : confused    Current inpatient medications :    ANTIBIOTICS/RELEVANT:  amoxicillin 500 milliGRAM(s) Oral every 8 hours    MEDICATIONS  (STANDING):  amLODIPine   Tablet 5 milliGRAM(s) Oral daily  aspirin  chewable 81 milliGRAM(s) Oral daily  docusate sodium 100 milliGRAM(s) Oral daily  enoxaparin Injectable 30 milliGRAM(s) SubCutaneous daily  ferrous    sulfate 325 milliGRAM(s) Oral daily  lactobacillus acidophilus 1 Tablet(s) Oral three times a day with meals  losartan 50 milliGRAM(s) Oral daily  pantoprazole   Suspension 40 milliGRAM(s) Oral before breakfast  risperiDONE   Tablet 0.5 milliGRAM(s) Oral at bedtime      Objective:  Vital Signs Last 24 Hrs  T(C): 36.8 (20 Nov 2018 09:17), Max: 37 (19 Nov 2018 17:07)  T(F): 98.2 (20 Nov 2018 09:17), Max: 98.6 (19 Nov 2018 17:07)  HR: 76 (20 Nov 2018 09:17) (46 - 76)  BP: 171/74 (20 Nov 2018 09:17) (151/67 - 195/71)  RR: 18 (20 Nov 2018 09:17) (18 - 18)  SpO2: 98% (20 Nov 2018 09:17) (95% - 98%)    Physical Exam:  General: no acute distress awake confused  Eyes: sclera anicteric, pupils equal and reactive to light  ENMT: buccal mucosa moist, pharynx not injected  Neck: supple, trachea midline  Lungs: clear, no wheeze/rhonchi  Cardiovascular: regular rate and rhythm, S1 S2  Abdomen: soft, nontender, no organomegaly present, bowel sounds normal  Neurological: alert and oriented x0, Cranial Nerves II-XII grossly intact  Skin: no increased ecchymosis/petechiae/purpura  Lymph Nodes: no palpable cervical/supraclavicular lymph nodes enlargements  Extremities: no cyanosis/clubbing/edema    LABS:                        12.4   7.56  )-----------( 206      ( 20 Nov 2018 07:51 )             36.4   11-20    145  |  109<H>  |  32<H>  ----------------------------<  97  3.9   |  26  |  1.08    Ca    9.1      20 Nov 2018 07:51             MICROBIOLOGY:    Culture - Urine (collected 14 Nov 2018 21:26)  Source: .Urine Clean Catch (Midstream)  Final Report (15 Nov 2018 22:33):    10,000 - 49,000 CFU/mL Streptococcus agalactiae (Group B) isolated    Group B streptococci are susceptible to ampicillin,    penicillin and cefazolin, but may be resistant to    erythromycin and clindamycin.    Recommendations for intrapartum prophylaxis for Group B    streptococci are penicillin or ampicillin.    Culture - Blood (collected 14 Nov 2018 21:18)  Source: .Blood Blood  Preliminary Report (15 Nov 2018 22:02):    No growth to date.    Culture - Blood (collected 14 Nov 2018 21:18)  Source: .Blood Blood  Preliminary Report (15 Nov 2018 22:02):    No growth to date.    RADIOLOGY & ADDITIONAL STUDIES:  EXAM:  XR CHEST PORTABLE IMMED 1V                            PROCEDURE DATE:  11/14/2018        INTERPRETATION:    DATE OF STUDY: 11/14/18.    PRIOR: 5/29/18.    CLINICAL INDICATION: 89-yo-male - syncope; unresponsive. Assess for chest   process.      TECHNIQUE: portable chest.    FINDINGS:   The study is limited by low lung volumes.  Thoracic aortic atheromatous changes and ectasia.  The heart is magnified by technique.  No acute congestive changes.  Trace left pleural effusion seen with associated left basal atelectasis.  No right lung focal consolidation.  No pneumothorax.  There are degenerative changes of the thoracic spine.    IMPRESSION:   Trace left pleural effusion has developed since 5/23/18 exam - with   associated left basilar atelectasis.  Right lung clear.    Assessment :  89 y/o M with hx of dementia, CAD, GERD, anemia, HLD, HTN, depression, aspiration PNA, biba   from [facility] with CC of episode of unresponsiveness sec UTI  Overall stable and improved    Plan :   Complete course of  Amoxicillin  Asp precautions  Dc planning    D/w Dr Reich    Continue with present regiment.  Appropriate use of antibiotics and adverse effects reviewed.      I have discussed the above plan of care with patient/ family in detail. They expressed understanding of the  treatment plan . Risks, benefits and alternatives discussed in detail. I have asked if they have any questions or concerns and appropriately addressed them to the best of my ability .    > 25 minutes were spent in direct patient care reviewing notes, medications ,labs data/ imaging , discussion with multidisciplinary team.    Thank you for allowing me to participate in care of your patient .    Andrea Lam MD  Infectious Disease  [phone]

## 2018-11-20 NOTE — DIETITIAN INITIAL EVALUATION ADULT. - OTHER INFO
89 year old male adm from [facility] s/p syncope and AMS, dx UTI.  Pt seen per LOS policy (</7days).  Pt c hx Alzheimers dementia, unable to obtain significant nutrition hx from pt.  Per RN and past nursing documentation, pt has had excellent appetite and intake, usually consuming ~100% of meals on Dysphagia #2 [facility] soft diet c thin liquids.  SLP eval pending to assess least restrictive diet consistency tolerated.  Skin intact. Noted d/c pending ASHLEY.

## 2018-11-21 LAB
ANION GAP SERPL CALC-SCNC: 5 MMOL/L — SIGNIFICANT CHANGE UP (ref 5–17)
BUN SERPL-MCNC: 24 MG/DL — HIGH (ref 7–23)
CALCIUM SERPL-MCNC: 9.1 MG/DL — SIGNIFICANT CHANGE UP (ref 8.4–10.5)
CHLORIDE SERPL-SCNC: 108 MMOL/L — SIGNIFICANT CHANGE UP (ref 96–108)
CO2 SERPL-SCNC: 27 MMOL/L — SIGNIFICANT CHANGE UP (ref 22–31)
CREAT SERPL-MCNC: 1 MG/DL — SIGNIFICANT CHANGE UP (ref 0.5–1.3)
GLUCOSE SERPL-MCNC: 83 MG/DL — SIGNIFICANT CHANGE UP (ref 70–99)
HCT VFR BLD CALC: 37.5 % — LOW (ref 39–50)
HGB BLD-MCNC: 12.8 G/DL — LOW (ref 13–17)
MCHC RBC-ENTMCNC: 33.7 PG — SIGNIFICANT CHANGE UP (ref 27–34)
MCHC RBC-ENTMCNC: 34.1 GM/DL — SIGNIFICANT CHANGE UP (ref 32–36)
MCV RBC AUTO: 98.7 FL — SIGNIFICANT CHANGE UP (ref 80–100)
NRBC # BLD: 0 /100 WBCS — SIGNIFICANT CHANGE UP (ref 0–0)
PLATELET # BLD AUTO: 218 K/UL — SIGNIFICANT CHANGE UP (ref 150–400)
POTASSIUM SERPL-MCNC: 3.9 MMOL/L — SIGNIFICANT CHANGE UP (ref 3.5–5.3)
POTASSIUM SERPL-SCNC: 3.9 MMOL/L — SIGNIFICANT CHANGE UP (ref 3.5–5.3)
RBC # BLD: 3.8 M/UL — LOW (ref 4.2–5.8)
RBC # FLD: 13.1 % — SIGNIFICANT CHANGE UP (ref 10.3–14.5)
SODIUM SERPL-SCNC: 140 MMOL/L — SIGNIFICANT CHANGE UP (ref 135–145)
WBC # BLD: 7.4 K/UL — SIGNIFICANT CHANGE UP (ref 3.8–10.5)
WBC # FLD AUTO: 7.4 K/UL — SIGNIFICANT CHANGE UP (ref 3.8–10.5)

## 2018-11-21 RX ORDER — AMLODIPINE BESYLATE 2.5 MG/1
5 TABLET ORAL ONCE
Qty: 0 | Refills: 0 | Status: COMPLETED | OUTPATIENT
Start: 2018-11-21 | End: 2018-11-21

## 2018-11-21 RX ORDER — AMLODIPINE BESYLATE 2.5 MG/1
10 TABLET ORAL DAILY
Qty: 0 | Refills: 0 | Status: DISCONTINUED | OUTPATIENT
Start: 2018-11-22 | End: 2018-11-26

## 2018-11-21 RX ADMIN — Medication 500 MILLIGRAM(S): at 13:57

## 2018-11-21 RX ADMIN — Medication 500 MILLIGRAM(S): at 21:13

## 2018-11-21 RX ADMIN — Medication 81 MILLIGRAM(S): at 12:04

## 2018-11-21 RX ADMIN — Medication 500 MILLIGRAM(S): at 05:18

## 2018-11-21 RX ADMIN — Medication 1 TABLET(S): at 12:04

## 2018-11-21 RX ADMIN — RISPERIDONE 0.5 MILLIGRAM(S): 4 TABLET ORAL at 21:13

## 2018-11-21 RX ADMIN — Medication 100 MILLIGRAM(S): at 12:05

## 2018-11-21 RX ADMIN — AMLODIPINE BESYLATE 5 MILLIGRAM(S): 2.5 TABLET ORAL at 12:04

## 2018-11-21 RX ADMIN — Medication 325 MILLIGRAM(S): at 12:04

## 2018-11-21 RX ADMIN — PANTOPRAZOLE SODIUM 40 MILLIGRAM(S): 20 TABLET, DELAYED RELEASE ORAL at 06:16

## 2018-11-21 RX ADMIN — AMLODIPINE BESYLATE 5 MILLIGRAM(S): 2.5 TABLET ORAL at 05:20

## 2018-11-21 RX ADMIN — LOSARTAN POTASSIUM 50 MILLIGRAM(S): 100 TABLET, FILM COATED ORAL at 05:20

## 2018-11-21 RX ADMIN — ENOXAPARIN SODIUM 30 MILLIGRAM(S): 100 INJECTION SUBCUTANEOUS at 12:05

## 2018-11-21 RX ADMIN — Medication 1 TABLET(S): at 08:51

## 2018-11-21 RX ADMIN — Medication 1 TABLET(S): at 17:58

## 2018-11-21 NOTE — PROGRESS NOTE ADULT - SUBJECTIVE AND OBJECTIVE BOX
Chief Complaint: AMS    Interval Events: No events overnight. Less altered today.    Review of Systems:  General: No fevers, chills, weight loss or gain  Skin: No rashes, color changes  Cardiovascular: No chest pain, orthopnea  Respiratory: No shortness of breath, cough  Gastrointestinal: No nausea, abdominal pain  Genitourinary: No incontinence, pain with urination  Musculoskeletal: No pain, swelling, decreased range of motion  Neurological: No headache, weakness  Psychiatric: No depression, anxiety  Endocrine: No weight loss or gain, increased thirst  All other systems are comprehensively negative.    Physical Exam:  Vital Signs Last 24 Hrs  T(C): 37.1 (21 Nov 2018 06:38), Max: 37.1 (21 Nov 2018 06:38)  T(F): 98.7 (21 Nov 2018 06:38), Max: 98.7 (21 Nov 2018 06:38)  HR: 59 (21 Nov 2018 06:38) (55 - 73)  BP: 176/72 (21 Nov 2018 06:38) (170/77 - 178/80)  BP(mean): --  RR: 20 (21 Nov 2018 06:38) (19 - 20)  SpO2: 96% (21 Nov 2018 06:38) (96% - 99%)  General: NAD  HEENT: MMM  Neck: No JVD, no carotid bruit  Lungs: CTAB  CV: RRR, nl S1/S2, no M/R/G  Abdomen: S/NT/ND, +BS  Extremities: No LE edema, no cyanosis  Neuro: AAOx3, non-focal  Skin: No rash    Labs:             11-21    140  |  108  |  24<H>  ----------------------------<  83  3.9   |  27  |  1.00    Ca    9.1      21 Nov 2018 07:34                          12.8   7.40  )-----------( 218      ( 21 Nov 2018 07:34 )             37.5               Telemetry: Sinus bradycardia, short pauses

## 2018-11-21 NOTE — PROGRESS NOTE ADULT - ASSESSMENT
89 y/o M with hx of dementia, CAD, GERD, anemia, HLD, HTN, depression, aspiration PNA, biba from [facility] assisted living for evaluation s/p episode of unresponsiveness this morning. As per aide from facility at bedside, pt was walking around this morning and ate his breakfast and then was sitting on the chair around 11am when he became unresponsive for ?few minutes, laid pt down on bed and oxygen given. Denies fall/head trauma. Pt unable to provide any history but denies pain. As per aide, altered mental status since becoming unresponsive. Responsive to verbal stimuli in ED.  afebrile in ED bp 165/55HR 64,  being admitted for AMS with h/o Cad HTn, Dementia , with syncope possible vasovagal, with possible UTi, cultures sent and started on ceftriaxone in ED.ECHO showes aortic stenosis and diastolic dysfunction , pt intermittently unresponsive, will hold and decrease resperdal, advanced care discussed with family and patient is DNR.  continue current care, leucocytosis resolved, patient is intermittently does not respond likely AMS with worsening dementia, Need help with all ADL and 2 person assist to ambulate,case d/w son  ID f up noted, Antibiotics changed to PO   pt eval and DC plan to rehab in progress.

## 2018-11-21 NOTE — PROGRESS NOTE ADULT - SUBJECTIVE AND OBJECTIVE BOX
Neurology follow up note    AGOSTINO [id number]      Interval History:    patient awake in bed     MEDICATIONS    amLODIPine   Tablet 5 milliGRAM(s) Oral daily  amoxicillin 500 milliGRAM(s) Oral every 8 hours  aspirin  chewable 81 milliGRAM(s) Oral daily  docusate sodium 100 milliGRAM(s) Oral daily  enoxaparin Injectable 30 milliGRAM(s) SubCutaneous daily  ferrous    sulfate 325 milliGRAM(s) Oral daily  lactobacillus acidophilus 1 Tablet(s) Oral three times a day with meals  losartan 50 milliGRAM(s) Oral daily  pantoprazole   Suspension 40 milliGRAM(s) Oral before breakfast  risperiDONE   Tablet 0.5 milliGRAM(s) Oral at bedtime      Allergies    No Known Allergies    Intolerances            Vital Signs Last 24 Hrs  T(C): 37.1 (21 Nov 2018 06:38), Max: 37.1 (21 Nov 2018 06:38)  T(F): 98.7 (21 Nov 2018 06:38), Max: 98.7 (21 Nov 2018 06:38)  HR: 59 (21 Nov 2018 06:38) (55 - 76)  BP: 176/72 (21 Nov 2018 06:38) (170/77 - 178/80)  BP(mean): --  RR: 20 (21 Nov 2018 06:38) (18 - 20)  SpO2: 96% (21 Nov 2018 06:38) (96% - 99%)      REVIEW OF SYSTEMS:  Limited or unable to obtain secondary to patient's poor mental status.    On Neurological Examination:    The patient is awake and alert.    Positive blink bilateral visual threat.  Pupils bilaterally 2 mm reactive to 1.    Speech, the patient would sat 1 to 2 words.  Normal baseline, he says a few words.    The patient is not following commands.    Motor:  Examination is limited.  I elevated bilateral upper extremities, was able to maintain elevated position.    Bilateral lower extremities with plantar stimulation, positive flexation at the hip and knee.   I did not notice any clear asymmetric movements.    Tone was increased bilateral upper and lower extremities.    Does not follow commands    GENERAL Exam: Nontoxic , No Acute Distress   	  HEENT:  normocephalic, atraumatic  		  LUNGS: Clear bilaterally  	  HEART: Normal S1S2   No murmur RRR        	  GI/ ABDOMEN:  Soft  Non tender    EXTREMITIES:   No Edema  No Clubbing  No Cyanosis     MUSCULOSKELETAL:  decreased Range of Motion all 4 extremities   	   SKIN: Normal  No Ecchymosis                LABS:  CBC Full  -  ( 21 Nov 2018 07:34 )  WBC Count : 7.40 K/uL  Hemoglobin : 12.8 g/dL  Hematocrit : 37.5 %  Platelet Count - Automated : 218 K/uL  Mean Cell Volume : 98.7 fl  Mean Cell Hemoglobin : 33.7 pg  Mean Cell Hemoglobin Concentration : 34.1 gm/dL  Auto Neutrophil # : x  Auto Lymphocyte # : x  Auto Monocyte # : x  Auto Eosinophil # : x  Auto Basophil # : x  Auto Neutrophil % : x  Auto Lymphocyte % : x  Auto Monocyte % : x  Auto Eosinophil % : x  Auto Basophil % : x      11-21    140  |  108  |  24<H>  ----------------------------<  83  3.9   |  27  |  1.00    Ca    9.1      21 Nov 2018 07:34      Hemoglobin A1C:       Vitamin B12         RADIOLOGY        ANALYSIS AND PLAN:  This is a 89-year-old with episode of unresponsiveness, history of dementia.  1.	For episode of unresponsiveness, this appears most likely a syncopal event.  There was no clear history documented to suggest underlying epilepsy and seizure attack, and there is no clear signs on examination to suggest a new cerebrovascular accident had ensued.  2.	I would recommend telemetry evaluation for underlying arrhythmia.  3.	Monitor systolic blood pressure adjust medications as needed   4.	For history of dementia secondary to the patient's age and appears to be advanced, we would recommend supportive therapy.  5.	antibiotics as needed  6.	spoke to son family at [phone]  fleix in past   7.	no new events     Thank you for the courtesy of consultation.    Physical therapy evaluation as tolerated  OOB to chair/ambulation with assistance only if possible.    Neurologic standpoint only cleared for discharge planning     Greater than 40 minutes spent in direct patient care reviewing  the notes, lab data/ imaging , discussion with multidisciplinary team.

## 2018-11-21 NOTE — PROGRESS NOTE ADULT - SUBJECTIVE AND OBJECTIVE BOX
NATIVIDAD LOMELI is a 89yMale , patient examined and chart reviewed.      INTERVAL HPI/ OVERNIGHT EVENTS:   Confused. No events.   Afebrile.    PAST MEDICAL & SURGICAL HISTORY:  Dementia  Depression  Hyperlipidemia  Dizziness  CAD (coronary artery disease)  Hypertension  No significant past surgical history    For details regarding the patient's social history, family history, and other miscellaneous elements, please refer the initial infectious diseases consultation and/or the admitting history and physical examination for this admission.    ROS:  Unable to obtain due to : confused    Current inpatient medications :    ANTIBIOTICS/RELEVANT:  amoxicillin 500 milliGRAM(s) Oral every 8 hours    MEDICATIONS  (STANDING):  aspirin  chewable 81 milliGRAM(s) Oral daily  docusate sodium 100 milliGRAM(s) Oral daily  enoxaparin Injectable 30 milliGRAM(s) SubCutaneous daily  ferrous    sulfate 325 milliGRAM(s) Oral daily  lactobacillus acidophilus 1 Tablet(s) Oral three times a day with meals  losartan 50 milliGRAM(s) Oral daily  pantoprazole   Suspension 40 milliGRAM(s) Oral before breakfast  risperiDONE   Tablet 0.5 milliGRAM(s) Oral at bedtime      Objective:  Vital Signs Last 24 Hrs  T(C): 36.4 (21 Nov 2018 08:45), Max: 37.1 (21 Nov 2018 06:38)  T(F): 97.5 (21 Nov 2018 08:45), Max: 98.7 (21 Nov 2018 06:38)  HR: 42 (21 Nov 2018 08:45) (42 - 73)  BP: 178/67 (21 Nov 2018 08:45) (170/77 - 178/80)  RR: 20 (21 Nov 2018 08:45) (19 - 20)  SpO2: 97% (21 Nov 2018 08:45) (96% - 99%)    Physical Exam:  General: no acute distress awake confused  Eyes: sclera anicteric, pupils equal and reactive to light  ENMT: buccal mucosa moist, pharynx not injected  Neck: supple, trachea midline  Lungs: clear, no wheeze/rhonchi  Cardiovascular: regular rate and rhythm, S1 S2  Abdomen: soft, nontender, no organomegaly present, bowel sounds normal  Neurological: alert and oriented x0, Cranial Nerves II-XII grossly intact  Skin: no increased ecchymosis/petechiae/purpura  Lymph Nodes: no palpable cervical/supraclavicular lymph nodes enlargements  Extremities: no cyanosis/clubbing/edema    LABS:                        12.8   7.40  )-----------( 218      ( 21 Nov 2018 07:34 )             37.5   11-21    140  |  108  |  24<H>  ----------------------------<  83  3.9   |  27  |  1.00    Ca    9.1      21 Nov 2018 07:34           MICROBIOLOGY:    Culture - Urine (collected 14 Nov 2018 21:26)  Source: .Urine Clean Catch (Midstream)  Final Report (15 Nov 2018 22:33):    10,000 - 49,000 CFU/mL Streptococcus agalactiae (Group B) isolated    Group B streptococci are susceptible to ampicillin,    penicillin and cefazolin, but may be resistant to    erythromycin and clindamycin.    Recommendations for intrapartum prophylaxis for Group B    streptococci are penicillin or ampicillin.    Culture - Blood (collected 14 Nov 2018 21:18)  Source: .Blood Blood  Preliminary Report (15 Nov 2018 22:02):    No growth to date.    Culture - Blood (collected 14 Nov 2018 21:18)  Source: .Blood Blood  Preliminary Report (15 Nov 2018 22:02):    No growth to date.    RADIOLOGY & ADDITIONAL STUDIES:  EXAM:  XR CHEST PORTABLE IMMED 1V                            PROCEDURE DATE:  11/14/2018        INTERPRETATION:    DATE OF STUDY: 11/14/18.    PRIOR: 5/29/18.    CLINICAL INDICATION: 89-yo-male - syncope; unresponsive. Assess for chest   process.      TECHNIQUE: portable chest.    FINDINGS:   The study is limited by low lung volumes.  Thoracic aortic atheromatous changes and ectasia.  The heart is magnified by technique.  No acute congestive changes.  Trace left pleural effusion seen with associated left basal atelectasis.  No right lung focal consolidation.  No pneumothorax.  There are degenerative changes of the thoracic spine.    IMPRESSION:   Trace left pleural effusion has developed since 5/23/18 exam - with   associated left basilar atelectasis.  Right lung clear.    Assessment :  89 y/o M with hx of dementia, CAD, GERD, anemia, HLD, HTN, depression, aspiration PNA, biba   from [facility] with CC of episode of unresponsiveness sec UTI  Overall stable and improved    Plan :   Complete course of  Amoxicillin  Asp precautions  Dc planning    D/w Dr Reich    Continue with present regiment.  Appropriate use of antibiotics and adverse effects reviewed.      I have discussed the above plan of care with patient/ family in detail. They expressed understanding of the  treatment plan . Risks, benefits and alternatives discussed in detail. I have asked if they have any questions or concerns and appropriately addressed them to the best of my ability .    > 25 minutes were spent in direct patient care reviewing notes, medications ,labs data/ imaging , discussion with multidisciplinary team.    Thank you for allowing me to participate in care of your patient .    Andrea Lam MD  Infectious Disease  [phone]

## 2018-11-21 NOTE — PROGRESS NOTE ADULT - ASSESSMENT
The patient is a 89 year old male with a history of dementia, HTN, HL, CAD, GERD, anemia who presented yesterday after an episode of unresponsiveness.    Plan:  - Telemetry with sinus bradycardia, no significant pauses  - Avoid rate lowering agents  - Echocardiogram with grossly normal LV systolic function, no significant valve issues  - Continue losartan 50 mg daily  - Add amlodipine 5 mg daily  - Hypertension in part due to agitation while checking BPs  - Discharge planning The patient is a 89 year old male with a history of dementia, HTN, HL, CAD, GERD, anemia who presented yesterday after an episode of unresponsiveness.    Plan:  - Telemetry with sinus bradycardia, no significant pauses  - Avoid rate lowering agents  - Echocardiogram with grossly normal LV systolic function, no significant valve issues  - Continue losartan 50 mg daily  - Increase amlodipine to 10 mg daily  - Hypertension in part due to agitation while checking BPs  - Discharge planning

## 2018-11-21 NOTE — PROGRESS NOTE ADULT - SUBJECTIVE AND OBJECTIVE BOX
Patient is a 89y old  Male who presents with a chief complaint of syncope and AMs (21 Nov 2018 12:42)      INTERVAL HPI/OVERNIGHT EVENTS:  no acute event overnight  Home Medications:  aspirin 81 mg oral tablet, chewable: 1 tab(s) orally once a day (14 Nov 2018 16:15)  Colace 100 mg oral capsule: 1 cap(s) orally once a day (14 Nov 2018 16:15)  ferrous sulfate 325 mg (65 mg elemental iron) oral tablet: 1 tab(s) orally once a day (14 Nov 2018 14:48)  Klor-Con 10 10 mEq oral tablet, extended release: orally once a day (14 Nov 2018 14:48)  Lasix 40 mg oral tablet: 1 tab(s) orally once a day (14 Nov 2018 14:48)  losartan 25 mg oral tablet: 1 tab(s) orally once a day (14 Nov 2018 14:48)  Protonix 40 mg oral granule, enteric coated: 1 each orally once a day (14 Nov 2018 14:48)  Risperdal 0.5 mg oral tablet: orally 3 times a day (14 Nov 2018 14:48)  Vitamin D3 2000 intl units oral tablet: 1 tab(s) orally once a day (14 Nov 2018 14:48)      MEDICATIONS  (STANDING):  amoxicillin 500 milliGRAM(s) Oral every 8 hours  aspirin  chewable 81 milliGRAM(s) Oral daily  docusate sodium 100 milliGRAM(s) Oral daily  enoxaparin Injectable 30 milliGRAM(s) SubCutaneous daily  ferrous    sulfate 325 milliGRAM(s) Oral daily  lactobacillus acidophilus 1 Tablet(s) Oral three times a day with meals  losartan 50 milliGRAM(s) Oral daily  pantoprazole   Suspension 40 milliGRAM(s) Oral before breakfast  risperiDONE   Tablet 0.5 milliGRAM(s) Oral at bedtime    MEDICATIONS  (PRN):      Allergies    No Known Allergies    Intolerances        REVIEW OF SYSTEMS:  unable as confused, denies any pain or SOb  Vital Signs Last 24 Hrs  T(C): 36.4 (21 Nov 2018 08:45), Max: 37.1 (21 Nov 2018 06:38)  T(F): 97.5 (21 Nov 2018 08:45), Max: 98.7 (21 Nov 2018 06:38)  HR: 42 (21 Nov 2018 08:45) (42 - 73)  BP: 178/67 (21 Nov 2018 08:45) (170/77 - 178/80)  BP(mean): --  RR: 20 (21 Nov 2018 08:45) (19 - 20)  SpO2: 97% (21 Nov 2018 08:45) (96% - 99%)    PHYSICAL EXAM:  GENERAL:  well-groomed, well-developed  HEAD:  Atraumatic, Normocephalic  EYES: EOMI, PERRLA, conjunctiva and sclera clear  ENMT: Moist mucous membranes,   NECK: Supple, No JVD, Normal thyroid  NERVOUS SYSTEM:  Alert & Oriented X1, poor memory and concentration; Motor Strength 4/5 B/L upper and lower extremities; DTRs 2+ intact and symmetric, unstable gait  CHEST/LUNG: Clear to percussion bilaterally; No rales, rhonchi, wheezing, or rubs  HEART: Regular rate and rhythm; No murmurs, rubs, or gallops  ABDOMEN: Soft, Nontender, Nondistended; Bowel sounds present  EXTREMITIES:  2+ Peripheral Pulses, No clubbing, cyanosis, or edema  SKIN: No rashes or lesions    LABS:                        12.8   7.40  )-----------( 218      ( 21 Nov 2018 07:34 )             37.5     11-21    140  |  108  |  24<H>  ----------------------------<  83  3.9   |  27  |  1.00    Ca    9.1      21 Nov 2018 07:34          CAPILLARY BLOOD GLUCOSE            Culture - Urine (collected 11-14-18 @ 21:26)  Source: .Urine Clean Catch (Midstream)  Final Report (11-15-18 @ 22:33):    10,000 - 49,000 CFU/mL Streptococcus agalactiae (Group B) isolated    Group B streptococci are susceptible to ampicillin,    penicillin and cefazolin, but may be resistant to    erythromycin and clindamycin.    Recommendations for intrapartum prophylaxis for Group B    streptococci are penicillin or ampicillin.    Culture - Blood (collected 11-14-18 @ 21:18)  Source: .Blood Blood  Final Report (11-19-18 @ 22:01):    No growth at 5 days.    Culture - Blood (collected 11-14-18 @ 21:18)  Source: .Blood Blood  Final Report (11-19-18 @ 22:01):    No growth at 5 days.        I&O's Summary    20 Nov 2018 07:01  -  21 Nov 2018 07:00  --------------------------------------------------------  IN: 860 mL / OUT: 0 mL / NET: 860 mL        RADIOLOGY & ADDITIONAL TESTS:    Imaging Personally Reviewed:  [x ] YES  [ ] NO    Consultant(s) Notes Reviewed:  [x ] YES  [ ] NO    Care Discussed with Consultants/Other Providers [x ] YES  [ ] NO

## 2018-11-22 LAB
ANION GAP SERPL CALC-SCNC: 10 MMOL/L — SIGNIFICANT CHANGE UP (ref 5–17)
BUN SERPL-MCNC: 22 MG/DL — SIGNIFICANT CHANGE UP (ref 7–23)
CALCIUM SERPL-MCNC: 9.1 MG/DL — SIGNIFICANT CHANGE UP (ref 8.4–10.5)
CHLORIDE SERPL-SCNC: 106 MMOL/L — SIGNIFICANT CHANGE UP (ref 96–108)
CO2 SERPL-SCNC: 26 MMOL/L — SIGNIFICANT CHANGE UP (ref 22–31)
CREAT SERPL-MCNC: 0.9 MG/DL — SIGNIFICANT CHANGE UP (ref 0.5–1.3)
GLUCOSE SERPL-MCNC: 82 MG/DL — SIGNIFICANT CHANGE UP (ref 70–99)
HCT VFR BLD CALC: 38.6 % — LOW (ref 39–50)
HGB BLD-MCNC: 13.1 G/DL — SIGNIFICANT CHANGE UP (ref 13–17)
MCHC RBC-ENTMCNC: 33.8 PG — SIGNIFICANT CHANGE UP (ref 27–34)
MCHC RBC-ENTMCNC: 33.9 GM/DL — SIGNIFICANT CHANGE UP (ref 32–36)
MCV RBC AUTO: 99.5 FL — SIGNIFICANT CHANGE UP (ref 80–100)
NRBC # BLD: 0 /100 WBCS — SIGNIFICANT CHANGE UP (ref 0–0)
PLATELET # BLD AUTO: 209 K/UL — SIGNIFICANT CHANGE UP (ref 150–400)
POTASSIUM SERPL-MCNC: 4 MMOL/L — SIGNIFICANT CHANGE UP (ref 3.5–5.3)
POTASSIUM SERPL-SCNC: 4 MMOL/L — SIGNIFICANT CHANGE UP (ref 3.5–5.3)
RBC # BLD: 3.88 M/UL — LOW (ref 4.2–5.8)
RBC # FLD: 13.3 % — SIGNIFICANT CHANGE UP (ref 10.3–14.5)
SODIUM SERPL-SCNC: 142 MMOL/L — SIGNIFICANT CHANGE UP (ref 135–145)
WBC # BLD: 6.58 K/UL — SIGNIFICANT CHANGE UP (ref 3.8–10.5)
WBC # FLD AUTO: 6.58 K/UL — SIGNIFICANT CHANGE UP (ref 3.8–10.5)

## 2018-11-22 RX ORDER — LOSARTAN POTASSIUM 100 MG/1
100 TABLET, FILM COATED ORAL DAILY
Qty: 0 | Refills: 0 | Status: DISCONTINUED | OUTPATIENT
Start: 2018-11-23 | End: 2018-11-25

## 2018-11-22 RX ORDER — LOSARTAN POTASSIUM 100 MG/1
50 TABLET, FILM COATED ORAL ONCE
Qty: 0 | Refills: 0 | Status: COMPLETED | OUTPATIENT
Start: 2018-11-22 | End: 2018-11-22

## 2018-11-22 RX ADMIN — ENOXAPARIN SODIUM 30 MILLIGRAM(S): 100 INJECTION SUBCUTANEOUS at 12:47

## 2018-11-22 RX ADMIN — RISPERIDONE 0.5 MILLIGRAM(S): 4 TABLET ORAL at 21:39

## 2018-11-22 RX ADMIN — AMLODIPINE BESYLATE 10 MILLIGRAM(S): 2.5 TABLET ORAL at 05:07

## 2018-11-22 RX ADMIN — Medication 81 MILLIGRAM(S): at 12:46

## 2018-11-22 RX ADMIN — Medication 100 MILLIGRAM(S): at 12:46

## 2018-11-22 RX ADMIN — LOSARTAN POTASSIUM 50 MILLIGRAM(S): 100 TABLET, FILM COATED ORAL at 05:07

## 2018-11-22 RX ADMIN — Medication 325 MILLIGRAM(S): at 12:46

## 2018-11-22 RX ADMIN — LOSARTAN POTASSIUM 50 MILLIGRAM(S): 100 TABLET, FILM COATED ORAL at 08:56

## 2018-11-22 RX ADMIN — Medication 1 TABLET(S): at 12:46

## 2018-11-22 RX ADMIN — Medication 1 TABLET(S): at 08:22

## 2018-11-22 RX ADMIN — PANTOPRAZOLE SODIUM 40 MILLIGRAM(S): 20 TABLET, DELAYED RELEASE ORAL at 06:35

## 2018-11-22 RX ADMIN — Medication 1 TABLET(S): at 17:42

## 2018-11-22 RX ADMIN — Medication 500 MILLIGRAM(S): at 05:07

## 2018-11-22 RX ADMIN — Medication 500 MILLIGRAM(S): at 12:47

## 2018-11-22 RX ADMIN — Medication 500 MILLIGRAM(S): at 21:39

## 2018-11-22 NOTE — PROGRESS NOTE ADULT - SUBJECTIVE AND OBJECTIVE BOX
Patient is a 89y old  Male who presents with a chief complaint of syncope and AMs (22 Nov 2018 08:04)      INTERVAL HPI/OVERNIGHT EVENTS:no ac event overnight    Home Medications:  aspirin 81 mg oral tablet, chewable: 1 tab(s) orally once a day (14 Nov 2018 16:15)  Colace 100 mg oral capsule: 1 cap(s) orally once a day (14 Nov 2018 16:15)  ferrous sulfate 325 mg (65 mg elemental iron) oral tablet: 1 tab(s) orally once a day (14 Nov 2018 14:48)  Klor-Con 10 10 mEq oral tablet, extended release: orally once a day (14 Nov 2018 14:48)  Lasix 40 mg oral tablet: 1 tab(s) orally once a day (14 Nov 2018 14:48)  losartan 25 mg oral tablet: 1 tab(s) orally once a day (14 Nov 2018 14:48)  Protonix 40 mg oral granule, enteric coated: 1 each orally once a day (14 Nov 2018 14:48)  Risperdal 0.5 mg oral tablet: orally 3 times a day (14 Nov 2018 14:48)  Vitamin D3 2000 intl units oral tablet: 1 tab(s) orally once a day (14 Nov 2018 14:48)      MEDICATIONS  (STANDING):  amLODIPine   Tablet 10 milliGRAM(s) Oral daily  amoxicillin 500 milliGRAM(s) Oral every 8 hours  aspirin  chewable 81 milliGRAM(s) Oral daily  docusate sodium 100 milliGRAM(s) Oral daily  enoxaparin Injectable 30 milliGRAM(s) SubCutaneous daily  ferrous    sulfate 325 milliGRAM(s) Oral daily  lactobacillus acidophilus 1 Tablet(s) Oral three times a day with meals  pantoprazole   Suspension 40 milliGRAM(s) Oral before breakfast  risperiDONE   Tablet 0.5 milliGRAM(s) Oral at bedtime    MEDICATIONS  (PRN):      Allergies    No Known Allergies    Intolerances        REVIEW OF SYSTEMS: unable as confused  Vital Signs Last 24 Hrs  T(C): 36.9 (22 Nov 2018 14:16), Max: 37.2 (22 Nov 2018 05:00)  T(F): 98.4 (22 Nov 2018 14:16), Max: 99 (22 Nov 2018 05:00)  HR: 69 (22 Nov 2018 14:16) (53 - 74)  BP: 145/71 (22 Nov 2018 14:16) (125/70 - 190/64)  BP(mean): --  RR: 18 (22 Nov 2018 14:16) (18 - 20)  SpO2: 96% (22 Nov 2018 14:16) (95% - 99%)    PHYSICAL EXAM:  GENERAL: well-groomed, well-developed  HEAD:  Atraumatic, Normocephalic  EYES: EOMI, PERRLA, conjunctiva and sclera clear  ENMT: Moist mucous membranes,   NECK: Supple, No JVD, Normal thyroid  NERVOUS SYSTEM:  Alert & Oriented X1, poor memory and concentration; Motor Strength 4/5 B/L upper and lower extremities; DTRs 2+ intact and symmetric  CHEST/LUNG: Clear to percussion bilaterally; No rales, rhonchi, wheezing, or rubs  HEART: Regular rate and rhythm; No murmurs, rubs, or gallops  ABDOMEN: Soft, Nontender, Nondistended; Bowel sounds present  EXTREMITIES:  2+ Peripheral Pulses, No clubbing, cyanosis, or edema  SKIN: No rashes or lesions    LABS:                        13.1   6.58  )-----------( 209      ( 22 Nov 2018 07:41 )             38.6     11-22    142  |  106  |  22  ----------------------------<  82  4.0   |  26  |  0.90    Ca    9.1      22 Nov 2018 07:41          CAPILLARY BLOOD GLUCOSE              I&O's Summary    22 Nov 2018 07:01  -  22 Nov 2018 16:46  --------------------------------------------------------  IN: 840 mL / OUT: 0 mL / NET: 840 mL        RADIOLOGY & ADDITIONAL TESTS:    Imaging Personally Reviewed:  [ x] YES  [ ] NO    Consultant(s) Notes Reviewed:  [x ] YES  [ ] NO    Care Discussed with Consultants/Other Providers [ ] YES  [ ] NO

## 2018-11-22 NOTE — PROGRESS NOTE ADULT - SUBJECTIVE AND OBJECTIVE BOX
Chief Complaint: AMS    Interval Events: No events overnight.    Review of Systems:  General: No fevers, chills, weight loss or gain  Skin: No rashes, color changes  Cardiovascular: No chest pain, orthopnea  Respiratory: No shortness of breath, cough  Gastrointestinal: No nausea, abdominal pain  Genitourinary: No incontinence, pain with urination  Musculoskeletal: No pain, swelling, decreased range of motion  Neurological: No headache, weakness  Psychiatric: No depression, anxiety  Endocrine: No weight loss or gain, increased thirst  All other systems are comprehensively negative.    Physical Exam:  Vital Signs Last 24 Hrs  T(C): 37.2 (22 Nov 2018 05:00), Max: 37.2 (22 Nov 2018 05:00)  T(F): 99 (22 Nov 2018 05:00), Max: 99 (22 Nov 2018 05:00)  HR: 53 (22 Nov 2018 05:00) (42 - 74)  BP: 190/64 (22 Nov 2018 05:35) (160/82 - 190/64)  BP(mean): --  RR: 20 (22 Nov 2018 05:00) (20 - 20)  SpO2: 97% (22 Nov 2018 05:00) (95% - 99%)  General: NAD  HEENT: MMM  Neck: No JVD, no carotid bruit  Lungs: CTAB  CV: RRR, nl S1/S2, no M/R/G  Abdomen: S/NT/ND, +BS  Extremities: No LE edema, no cyanosis  Neuro: AAOx3, non-focal  Skin: No rash    Labs:             11-22    142  |  106  |  22  ----------------------------<  82  4.0   |  26  |  0.90    Ca    9.1      22 Nov 2018 07:41                          13.1   6.58  )-----------( 209      ( 22 Nov 2018 07:41 )             38.6

## 2018-11-22 NOTE — PROGRESS NOTE ADULT - SUBJECTIVE AND OBJECTIVE BOX
Neurology follow up note    AGOSTINO [id number]      Interval History:    patient awake in bed     MEDICATIONS    amLODIPine   Tablet 10 milliGRAM(s) Oral daily  amoxicillin 500 milliGRAM(s) Oral every 8 hours  aspirin  chewable 81 milliGRAM(s) Oral daily  docusate sodium 100 milliGRAM(s) Oral daily  enoxaparin Injectable 30 milliGRAM(s) SubCutaneous daily  ferrous    sulfate 325 milliGRAM(s) Oral daily  lactobacillus acidophilus 1 Tablet(s) Oral three times a day with meals  losartan 50 milliGRAM(s) Oral daily  pantoprazole   Suspension 40 milliGRAM(s) Oral before breakfast  risperiDONE   Tablet 0.5 milliGRAM(s) Oral at bedtime      Allergies    No Known Allergies    Intolerances            Vital Signs Last 24 Hrs  T(C): 37.2 (22 Nov 2018 05:00), Max: 37.2 (22 Nov 2018 05:00)  T(F): 99 (22 Nov 2018 05:00), Max: 99 (22 Nov 2018 05:00)  HR: 53 (22 Nov 2018 05:00) (42 - 74)  BP: 190/64 (22 Nov 2018 05:35) (160/82 - 190/64)  BP(mean): --  RR: 20 (22 Nov 2018 05:00) (20 - 20)  SpO2: 97% (22 Nov 2018 05:00) (95% - 99%)      REVIEW OF SYSTEMS:  Limited or unable to obtain secondary to patient's poor mental status.    On Neurological Examination:    The patient is awake and alert.    Positive blink bilateral visual threat.  Pupils bilaterally 2 mm reactive to 1.    Speech, the patient would sat 1 to 2 words.  Normal baseline, he says a few words.    The patient is not following commands.    Motor:  Examination is limited.  I elevated bilateral upper extremities, was able to maintain elevated position.    Bilateral lower extremities with plantar stimulation, positive flexation at the hip and knee.   I did not notice any clear asymmetric movements.    Tone was increased bilateral upper and lower extremities.    Does not follow commands    GENERAL Exam: Nontoxic , No Acute Distress   	  HEENT:  normocephalic, atraumatic  		  LUNGS: Clear bilaterally  	  HEART: Normal S1S2   No murmur RRR        	  GI/ ABDOMEN:  Soft  Non tender    EXTREMITIES:   No Edema  No Clubbing  No Cyanosis     MUSCULOSKELETAL:  decreased Range of Motion all 4 extremities   	   SKIN: Normal  No Ecchymosis                     LABS:  CBC Full  -  ( 22 Nov 2018 07:41 )  WBC Count : 6.58 K/uL  Hemoglobin : 13.1 g/dL  Hematocrit : 38.6 %  Platelet Count - Automated : 209 K/uL  Mean Cell Volume : 99.5 fl  Mean Cell Hemoglobin : 33.8 pg  Mean Cell Hemoglobin Concentration : 33.9 gm/dL  Auto Neutrophil # : x  Auto Lymphocyte # : x  Auto Monocyte # : x  Auto Eosinophil # : x  Auto Basophil # : x  Auto Neutrophil % : x  Auto Lymphocyte % : x  Auto Monocyte % : x  Auto Eosinophil % : x  Auto Basophil % : x      11-21    140  |  108  |  24<H>  ----------------------------<  83  3.9   |  27  |  1.00    Ca    9.1      21 Nov 2018 07:34      Hemoglobin A1C:       Vitamin B12         RADIOLOGY      ANALYSIS AND PLAN:  This is a 89-year-old with episode of unresponsiveness, history of dementia.  1.	For episode of unresponsiveness, this appears most likely a syncopal event.  There was no clear history documented to suggest underlying epilepsy and seizure attack, and there is no clear signs on examination to suggest a new cerebrovascular accident had ensued.  2.	I would recommend telemetry evaluation for underlying arrhythmia.  3.	Monitor systolic blood pressure adjust medications as needed   4.	For history of dementia secondary to the patient's age and appears to be advanced, we would recommend supportive therapy.  5.	antibiotics as needed  6.	monitor oral intake   7.	spoke to son family at [phone]  fleix in past   8.	no new events     Thank you for the courtesy of consultation.    Physical therapy evaluation as tolerated  OOB to chair/ambulation with assistance only if possible.    Neurologic standpoint only cleared for discharge planning     Greater than 35 minutes spent in direct patient care reviewing  the notes, lab data/ imaging , discussion with multidisciplinary team.

## 2018-11-22 NOTE — PROGRESS NOTE ADULT - SUBJECTIVE AND OBJECTIVE BOX
NATIVIDAD LOMELI is a 89yMale , patient examined and chart reviewed.      INTERVAL HPI/ OVERNIGHT EVENTS:   Confused. No events.   Afebrile.    PAST MEDICAL & SURGICAL HISTORY:  Dementia  Depression  Hyperlipidemia  Dizziness  CAD (coronary artery disease)  Hypertension  No significant past surgical history    For details regarding the patient's social history, family history, and other miscellaneous elements, please refer the initial infectious diseases consultation and/or the admitting history and physical examination for this admission.    ROS:  Unable to obtain due to : confused    Current inpatient medications :    ANTIBIOTICS/RELEVANT:    MEDICATIONS  (STANDING):  aspirin  chewable 81 milliGRAM(s) Oral daily  docusate sodium 100 milliGRAM(s) Oral daily  enoxaparin Injectable 30 milliGRAM(s) SubCutaneous daily  ferrous    sulfate 325 milliGRAM(s) Oral daily  lactobacillus acidophilus 1 Tablet(s) Oral three times a day with meals  losartan 50 milliGRAM(s) Oral daily  pantoprazole   Suspension 40 milliGRAM(s) Oral before breakfast  risperiDONE   Tablet 0.5 milliGRAM(s) Oral at bedtime      Objective:  Vital Signs Last 24 Hrs  T(C): 36.8 (22 Nov 2018 21:12), Max: 37.2 (22 Nov 2018 05:00)  T(F): 98.2 (22 Nov 2018 21:12), Max: 99 (22 Nov 2018 05:00)  HR: 63 (22 Nov 2018 21:12) (53 - 69)  BP: 139/75 (22 Nov 2018 21:12) (125/70 - 190/64)  RR: 18 (22 Nov 2018 21:12) (18 - 20)  SpO2: 95% (22 Nov 2018 21:12) (95% - 99%)    Physical Exam:  General: no acute distress awake confused  Eyes: sclera anicteric, pupils equal and reactive to light  ENMT: buccal mucosa moist, pharynx not injected  Neck: supple, trachea midline  Lungs: clear, no wheeze/rhonchi  Cardiovascular: regular rate and rhythm, S1 S2  Abdomen: soft, nontender, no organomegaly present, bowel sounds normal  Neurological: alert and oriented x0, Cranial Nerves II-XII grossly intact  Skin: no increased ecchymosis/petechiae/purpura  Lymph Nodes: no palpable cervical/supraclavicular lymph nodes enlargements  Extremities: no cyanosis/clubbing/edema    LABS:                        13.1   6.58  )-----------( 209      ( 22 Nov 2018 07:41 )             38.6   11-22    142  |  106  |  22  ----------------------------<  82  4.0   |  26  |  0.90    Ca    9.1      22 Nov 2018 07:41               MICROBIOLOGY:    Culture - Urine (collected 14 Nov 2018 21:26)  Source: .Urine Clean Catch (Midstream)  Final Report (15 Nov 2018 22:33):    10,000 - 49,000 CFU/mL Streptococcus agalactiae (Group B) isolated    Group B streptococci are susceptible to ampicillin,    penicillin and cefazolin, but may be resistant to    erythromycin and clindamycin.    Recommendations for intrapartum prophylaxis for Group B    streptococci are penicillin or ampicillin.    Culture - Blood (collected 14 Nov 2018 21:18)  Source: .Blood Blood  Preliminary Report (15 Nov 2018 22:02):    No growth to date.    Culture - Blood (collected 14 Nov 2018 21:18)  Source: .Blood Blood  Preliminary Report (15 Nov 2018 22:02):    No growth to date.    RADIOLOGY & ADDITIONAL STUDIES:  EXAM:  XR CHEST PORTABLE IMMED 1V                            PROCEDURE DATE:  11/14/2018        INTERPRETATION:    DATE OF STUDY: 11/14/18.    PRIOR: 5/29/18.    CLINICAL INDICATION: 89-yo-male - syncope; unresponsive. Assess for chest   process.      TECHNIQUE: portable chest.    FINDINGS:   The study is limited by low lung volumes.  Thoracic aortic atheromatous changes and ectasia.  The heart is magnified by technique.  No acute congestive changes.  Trace left pleural effusion seen with associated left basal atelectasis.  No right lung focal consolidation.  No pneumothorax.  There are degenerative changes of the thoracic spine.    IMPRESSION:   Trace left pleural effusion has developed since 5/23/18 exam - with   associated left basilar atelectasis.  Right lung clear.    Assessment :  89 y/o M with hx of dementia, CAD, GERD, anemia, HLD, HTN, depression, aspiration PNA, biba   from [facility] with CC of episode of unresponsiveness sec UTI  Overall stable and improved    Plan :   Monitor off antibiotics  Completed course of  Amoxicillin  Asp precautions  Dc planning      Continue with present regiment.  Appropriate use of antibiotics and adverse effects reviewed.      I have discussed the above plan of care with patient/ family in detail. They expressed understanding of the  treatment plan . Risks, benefits and alternatives discussed in detail. I have asked if they have any questions or concerns and appropriately addressed them to the best of my ability .    > 25 minutes were spent in direct patient care reviewing notes, medications ,labs data/ imaging , discussion with multidisciplinary team.    Thank you for allowing me to participate in care of your patient .    Andrea Lam MD  Infectious Disease  [phone]

## 2018-11-22 NOTE — PROGRESS NOTE ADULT - ASSESSMENT
The patient is a 89 year old male with a history of dementia, HTN, HL, CAD, GERD, anemia who presented yesterday after an episode of unresponsiveness.    Plan:  - Telemetry with sinus bradycardia, no significant pauses  - Avoid rate lowering agents  - Echocardiogram with grossly normal LV systolic function, no significant valve issues  - Increase losartan to 100 mg daily  - Continue amlodipine 10 mg daily  - Discharge planning

## 2018-11-23 RX ADMIN — Medication 81 MILLIGRAM(S): at 11:13

## 2018-11-23 RX ADMIN — LOSARTAN POTASSIUM 100 MILLIGRAM(S): 100 TABLET, FILM COATED ORAL at 05:38

## 2018-11-23 RX ADMIN — Medication 1 TABLET(S): at 16:46

## 2018-11-23 RX ADMIN — RISPERIDONE 0.5 MILLIGRAM(S): 4 TABLET ORAL at 21:52

## 2018-11-23 RX ADMIN — Medication 100 MILLIGRAM(S): at 11:13

## 2018-11-23 RX ADMIN — Medication 1 TABLET(S): at 07:55

## 2018-11-23 RX ADMIN — ENOXAPARIN SODIUM 30 MILLIGRAM(S): 100 INJECTION SUBCUTANEOUS at 11:13

## 2018-11-23 RX ADMIN — AMLODIPINE BESYLATE 10 MILLIGRAM(S): 2.5 TABLET ORAL at 05:38

## 2018-11-23 RX ADMIN — PANTOPRAZOLE SODIUM 40 MILLIGRAM(S): 20 TABLET, DELAYED RELEASE ORAL at 05:38

## 2018-11-23 RX ADMIN — Medication 325 MILLIGRAM(S): at 11:13

## 2018-11-23 RX ADMIN — Medication 1 TABLET(S): at 11:12

## 2018-11-23 NOTE — PROGRESS NOTE ADULT - ASSESSMENT
89 y/o M with hx of dementia, CAD, GERD, anemia, HLD, HTN, depression, aspiration PNA, biba from [facility] assisted living for evaluation s/p episode of unresponsiveness this morning. As per aide from facility at bedside, pt was walking around this morning and ate his breakfast and then was sitting on the chair around 11am when he became unresponsive for ?few minutes, laid pt down on bed and oxygen given. Denies fall/head trauma. Pt unable to provide any history but denies pain. As per aide, altered mental status since becoming unresponsive. Responsive to verbal stimuli in ED.  afebrile in ED bp 165/55HR 64,  being admitted for AMS with h/o Cad HTn, Dementia , with syncope possible vasovagal, with possible UTi, cultures sent and started on ceftriaxone in ED.ECHO showes aortic stenosis and diastolic dysfunction , pt intermittently unresponsive, will hold and decrease resperdal, advanced care discussed with family and patient is DNR.  continue current care, leucocytosis resolved, patient is intermittently does not respond likely AMS with worsening dementia, Need help with all ADL and 2 person assist to ambulate,case d/w son  ID f up noted, Antibiotics changed to PO , has orophryngeal dysphagia and advised recurrent aspirations and is on mechanically soft with thin liquids  pt eval and DC plan to rehab in progress.

## 2018-11-23 NOTE — PROGRESS NOTE ADULT - SUBJECTIVE AND OBJECTIVE BOX
Patient is a 89y old  Male who presents with a chief complaint of syncope and AMs (23 Nov 2018 08:13)      INTERVAL HPI/OVERNIGHT EVENTS:no acute event  overnight    Home Medications:  aspirin 81 mg oral tablet, chewable: 1 tab(s) orally once a day (14 Nov 2018 16:15)  Colace 100 mg oral capsule: 1 cap(s) orally once a day (14 Nov 2018 16:15)  ferrous sulfate 325 mg (65 mg elemental iron) oral tablet: 1 tab(s) orally once a day (14 Nov 2018 14:48)  Klor-Con 10 10 mEq oral tablet, extended release: orally once a day (14 Nov 2018 14:48)  Lasix 40 mg oral tablet: 1 tab(s) orally once a day (14 Nov 2018 14:48)  losartan 25 mg oral tablet: 1 tab(s) orally once a day (14 Nov 2018 14:48)  Protonix 40 mg oral granule, enteric coated: 1 each orally once a day (14 Nov 2018 14:48)  Risperdal 0.5 mg oral tablet: orally 3 times a day (14 Nov 2018 14:48)  Vitamin D3 2000 intl units oral tablet: 1 tab(s) orally once a day (14 Nov 2018 14:48)      MEDICATIONS  (STANDING):  amLODIPine   Tablet 10 milliGRAM(s) Oral daily  aspirin  chewable 81 milliGRAM(s) Oral daily  docusate sodium 100 milliGRAM(s) Oral daily  enoxaparin Injectable 30 milliGRAM(s) SubCutaneous daily  ferrous    sulfate 325 milliGRAM(s) Oral daily  lactobacillus acidophilus 1 Tablet(s) Oral three times a day with meals  losartan 100 milliGRAM(s) Oral daily  pantoprazole   Suspension 40 milliGRAM(s) Oral before breakfast  risperiDONE   Tablet 0.5 milliGRAM(s) Oral at bedtime    MEDICATIONS  (PRN):      Allergies    No Known Allergies    Intolerances        REVIEW OF SYSTEMS:  unable as confused , denies any pain or SOB    Vital Signs Last 24 Hrs  T(C): 36.6 (23 Nov 2018 09:21), Max: 36.9 (22 Nov 2018 14:16)  T(F): 97.9 (23 Nov 2018 09:21), Max: 98.4 (22 Nov 2018 14:16)  HR: 70 (23 Nov 2018 09:21) (53 - 70)  BP: 151/70 (23 Nov 2018 09:21) (139/75 - 168/88)  BP(mean): --  RR: 18 (23 Nov 2018 09:21) (18 - 18)  SpO2: 96% (23 Nov 2018 09:21) (95% - 97%)    PHYSICAL EXAM:  GENERAL:  well-groomed, well-developed  HEAD:  Atraumatic, Normocephalic  EYES: EOMI, PERRLA, conjunctiva and sclera clear  ENMT: Moist mucous membranes,   NECK: Supple, No JVD, Normal thyroid  NERVOUS SYSTEM:  Alert & Oriented X1, poor memory and concentration; Motor Strength 4/5 B/L upper and lower extremities; DTRs 2+ intact and symmetric, unstable gait  CHEST/LUNG: Clear to percussion bilaterally; No rales, rhonchi, wheezing, or rubs  HEART: Regular rate and rhythm; No murmurs, rubs, or gallops  ABDOMEN: Soft, Nontender, Nondistended; Bowel sounds present  EXTREMITIES:  2+ Peripheral Pulses, No clubbing, cyanosis, or edema  SKIN: No rashes or lesions    LABS:                        13.1   6.58  )-----------( 209      ( 22 Nov 2018 07:41 )             38.6     11-22    142  |  106  |  22  ----------------------------<  82  4.0   |  26  |  0.90    Ca    9.1      22 Nov 2018 07:41          CAPILLARY BLOOD GLUCOSE              I&O's Summary    22 Nov 2018 07:01  -  23 Nov 2018 07:00  --------------------------------------------------------  IN: 1290 mL / OUT: 0 mL / NET: 1290 mL    23 Nov 2018 07:01  -  23 Nov 2018 11:25  --------------------------------------------------------  IN: 420 mL / OUT: 0 mL / NET: 420 mL        RADIOLOGY & ADDITIONAL TESTS:    Imaging Personally Reviewed:  [ x] YES  [ ] NO    Consultant(s) Notes Reviewed:  [x ] YES  [ ] NO    Care Discussed with Consultants/Other Providers [x ] YES  [ ] NO

## 2018-11-23 NOTE — PROGRESS NOTE ADULT - SUBJECTIVE AND OBJECTIVE BOX
Neurology follow up note    AGOSTINO [id number]      Interval History:    patient awake in bed     MEDICATIONS    amLODIPine   Tablet 10 milliGRAM(s) Oral daily  aspirin  chewable 81 milliGRAM(s) Oral daily  docusate sodium 100 milliGRAM(s) Oral daily  enoxaparin Injectable 30 milliGRAM(s) SubCutaneous daily  ferrous    sulfate 325 milliGRAM(s) Oral daily  lactobacillus acidophilus 1 Tablet(s) Oral three times a day with meals  losartan 100 milliGRAM(s) Oral daily  pantoprazole   Suspension 40 milliGRAM(s) Oral before breakfast  risperiDONE   Tablet 0.5 milliGRAM(s) Oral at bedtime      Allergies    No Known Allergies    Intolerances            Vital Signs Last 24 Hrs  T(C): 36.8 (23 Nov 2018 00:24), Max: 37.1 (22 Nov 2018 09:15)  T(F): 98.2 (23 Nov 2018 00:24), Max: 98.7 (22 Nov 2018 09:15)  HR: 53 (23 Nov 2018 00:24) (53 - 69)  BP: 141/67 (23 Nov 2018 00:24) (125/70 - 168/88)  BP(mean): --  RR: 18 (23 Nov 2018 00:24) (18 - 20)  SpO2: 95% (23 Nov 2018 00:24) (95% - 97%)           REVIEW OF SYSTEMS:  Limited or unable to obtain secondary to patient's poor mental status.    On Neurological Examination:    The patient is awake and alert.    Positive blink bilateral visual threat.  Pupils bilaterally 2 mm reactive to 1.    Speech, the patient would sat 1 to 2 words.  Normal baseline, he says a few words.    The patient is not following commands.    Motor:  Examination is limited.  I elevated bilateral upper extremities, was able to maintain elevated position.    Bilateral lower extremities with plantar stimulation, positive flexation at the hip and knee.   I did not notice any clear asymmetric movements.    Tone was increased bilateral upper and lower extremities.    Does not follow commands    GENERAL Exam: Nontoxic , No Acute Distress   	  HEENT:  normocephalic, atraumatic  		  LUNGS: Clear bilaterally  	  HEART: Normal S1S2   No murmur RRR        	  GI/ ABDOMEN:  Soft  Non tender    EXTREMITIES:   No Edema  No Clubbing  No Cyanosis     MUSCULOSKELETAL:  decreased Range of Motion all 4 extremities   	   SKIN: Normal  No Ecchymosis          LABS:  CBC Full  -  ( 22 Nov 2018 07:41 )  WBC Count : 6.58 K/uL  Hemoglobin : 13.1 g/dL  Hematocrit : 38.6 %  Platelet Count - Automated : 209 K/uL  Mean Cell Volume : 99.5 fl  Mean Cell Hemoglobin : 33.8 pg  Mean Cell Hemoglobin Concentration : 33.9 gm/dL  Auto Neutrophil # : x  Auto Lymphocyte # : x  Auto Monocyte # : x  Auto Eosinophil # : x  Auto Basophil # : x  Auto Neutrophil % : x  Auto Lymphocyte % : x  Auto Monocyte % : x  Auto Eosinophil % : x  Auto Basophil % : x      11-22    142  |  106  |  22  ----------------------------<  82  4.0   |  26  |  0.90    Ca    9.1      22 Nov 2018 07:41      Hemoglobin A1C:       Vitamin B12         RADIOLOGY    ANALYSIS AND PLAN:  This is a 89-year-old with episode of unresponsiveness, history of dementia.  1.	For episode of unresponsiveness, this appears most likely a syncopal event.  There was no clear history documented to suggest underlying epilepsy and seizure attack, and there is no clear signs on examination to suggest a new cerebrovascular accident had ensued.  2.	I would recommend telemetry evaluation for underlying arrhythmia.  3.	Monitor systolic blood pressure adjust medications as needed   4.	For history of dementia secondary to the patient's age and appears to be advanced, we would recommend supportive therapy.  5.	antibiotics as needed  6.	monitor oral intake   7.	spoke to son family at [phone]  fleix in past   8.	no new events   9.	fall precautions     Thank you for the courtesy of consultation.    Physical therapy evaluation as tolerated  OOB to chair/ambulation with assistance only if possible.    Neurologic standpoint only cleared for discharge planning     Greater than 35 minutes spent in direct patient care reviewing  the notes, lab data/ imaging , discussion with multidisciplinary team.

## 2018-11-23 NOTE — PROGRESS NOTE ADULT - SUBJECTIVE AND OBJECTIVE BOX
NATIVIDAD LOMELI is a 89yMale , patient examined and chart reviewed.      INTERVAL HPI/ OVERNIGHT EVENTS:   Confused.  Afebrile.    PAST MEDICAL & SURGICAL HISTORY:  Dementia  Depression  Hyperlipidemia  Dizziness  CAD (coronary artery disease)  Hypertension  No significant past surgical history    For details regarding the patient's social history, family history, and other miscellaneous elements, please refer the initial infectious diseases consultation and/or the admitting history and physical examination for this admission.    ROS:  Unable to obtain due to : confused    Current inpatient medications :    ANTIBIOTICS/RELEVANT:    MEDICATIONS  (STANDING):  aspirin  chewable 81 milliGRAM(s) Oral daily  docusate sodium 100 milliGRAM(s) Oral daily  enoxaparin Injectable 30 milliGRAM(s) SubCutaneous daily  ferrous    sulfate 325 milliGRAM(s) Oral daily  lactobacillus acidophilus 1 Tablet(s) Oral three times a day with meals  losartan 50 milliGRAM(s) Oral daily  pantoprazole   Suspension 40 milliGRAM(s) Oral before breakfast  risperiDONE   Tablet 0.5 milliGRAM(s) Oral at bedtime      Objective:  Vital Signs Last 24 Hrs  T(C): 36.9 (23 Nov 2018 17:25), Max: 36.9 (23 Nov 2018 14:10)  T(F): 98.4 (23 Nov 2018 17:25), Max: 98.4 (23 Nov 2018 14:10)  HR: 83 (23 Nov 2018 17:25) (53 - 83)  BP: 159/69 (23 Nov 2018 14:10) (141/67 - 159/69)  RR: 18 (23 Nov 2018 17:25) (18 - 18)  SpO2: 96% (23 Nov 2018 17:25) (95% - 97%)    Physical Exam:  General: no acute distress awake confused  Eyes: sclera anicteric, pupils equal and reactive to light  ENMT: buccal mucosa moist, pharynx not injected  Neck: supple, trachea midline  Lungs: clear, no wheeze/rhonchi  Cardiovascular: regular rate and rhythm, S1 S2  Abdomen: soft, nontender, no organomegaly present, bowel sounds normal  Neurological: alert and oriented x0, Cranial Nerves II-XII grossly intact  Skin: no increased ecchymosis/petechiae/purpura  Lymph Nodes: no palpable cervical/supraclavicular lymph nodes enlargements  Extremities: no cyanosis/clubbing/edema    LABS:                                   13.1   6.58  )-----------( 209      ( 22 Nov 2018 07:41 )             38.6              MICROBIOLOGY:    Culture - Urine (collected 14 Nov 2018 21:26)  Source: .Urine Clean Catch (Midstream)  Final Report (15 Nov 2018 22:33):    10,000 - 49,000 CFU/mL Streptococcus agalactiae (Group B) isolated    Group B streptococci are susceptible to ampicillin,    penicillin and cefazolin, but may be resistant to    erythromycin and clindamycin.    Recommendations for intrapartum prophylaxis for Group B    streptococci are penicillin or ampicillin.    Culture - Blood (collected 14 Nov 2018 21:18)  Source: .Blood Blood  Preliminary Report (15 Nov 2018 22:02):    No growth to date.    Culture - Blood (collected 14 Nov 2018 21:18)  Source: .Blood Blood  Preliminary Report (15 Nov 2018 22:02):    No growth to date.    RADIOLOGY & ADDITIONAL STUDIES:  EXAM:  XR CHEST PORTABLE IMMED 1V                            PROCEDURE DATE:  11/14/2018        INTERPRETATION:    DATE OF STUDY: 11/14/18.    PRIOR: 5/29/18.    CLINICAL INDICATION: 89-yo-male - syncope; unresponsive. Assess for chest   process.      TECHNIQUE: portable chest.    FINDINGS:   The study is limited by low lung volumes.  Thoracic aortic atheromatous changes and ectasia.  The heart is magnified by technique.  No acute congestive changes.  Trace left pleural effusion seen with associated left basal atelectasis.  No right lung focal consolidation.  No pneumothorax.  There are degenerative changes of the thoracic spine.    IMPRESSION:   Trace left pleural effusion has developed since 5/23/18 exam - with   associated left basilar atelectasis.  Right lung clear.    Assessment :  89 y/o M with hx of dementia, CAD, GERD, anemia, HLD, HTN, depression, aspiration PNA, biba   from [facility] with CC of episode of unresponsiveness sec UTI  Overall stable and improved    Plan :   Monitor off antibiotics  Completed course of  Amoxicillin  Asp precautions  Dc planning      Continue with present regiment.  Appropriate use of antibiotics and adverse effects reviewed.      I have discussed the above plan of care with patient/ family in detail. They expressed understanding of the  treatment plan . Risks, benefits and alternatives discussed in detail. I have asked if they have any questions or concerns and appropriately addressed them to the best of my ability .    > 25 minutes were spent in direct patient care reviewing notes, medications ,labs data/ imaging , discussion with multidisciplinary team.    Thank you for allowing me to participate in care of your patient .    Andrea Lam MD  Infectious Disease  [phone]

## 2018-11-23 NOTE — PROGRESS NOTE ADULT - ASSESSMENT
The patient is a 89 year old male with a history of dementia, HTN, HL, CAD, GERD, anemia who presented yesterday after an episode of unresponsiveness.    Plan:  - Telemetry with sinus bradycardia, no significant pauses  - Avoid rate lowering agents  - Echocardiogram with grossly normal LV systolic function, no significant valve issues  - Continue losartan 100 mg daily  - Continue amlodipine 10 mg daily  - Discharge planning

## 2018-11-23 NOTE — GOALS OF CARE CONVERSATION - PERSONAL ADVANCE DIRECTIVE - CONVERSATION DETAILS
Spoke with pt's son Remington via phone re: hospice services for pt at [facility]; son is agreeable with Hospice POC; pt presented for admission and was approved for services. Consents left in pt's chart for Remington's signature and e-mail also obtained for [facility] referral dept to e-mail consents. Pt to be d/c to [facility] once signed consents received by Hospice.

## 2018-11-24 RX ADMIN — RISPERIDONE 0.5 MILLIGRAM(S): 4 TABLET ORAL at 21:21

## 2018-11-24 RX ADMIN — AMLODIPINE BESYLATE 10 MILLIGRAM(S): 2.5 TABLET ORAL at 06:02

## 2018-11-24 RX ADMIN — ENOXAPARIN SODIUM 30 MILLIGRAM(S): 100 INJECTION SUBCUTANEOUS at 12:17

## 2018-11-24 RX ADMIN — Medication 325 MILLIGRAM(S): at 12:18

## 2018-11-24 RX ADMIN — LOSARTAN POTASSIUM 100 MILLIGRAM(S): 100 TABLET, FILM COATED ORAL at 06:02

## 2018-11-24 RX ADMIN — Medication 1 TABLET(S): at 18:38

## 2018-11-24 RX ADMIN — Medication 100 MILLIGRAM(S): at 12:18

## 2018-11-24 RX ADMIN — Medication 81 MILLIGRAM(S): at 12:17

## 2018-11-24 RX ADMIN — Medication 1 TABLET(S): at 12:18

## 2018-11-24 RX ADMIN — PANTOPRAZOLE SODIUM 40 MILLIGRAM(S): 20 TABLET, DELAYED RELEASE ORAL at 06:02

## 2018-11-24 NOTE — PROGRESS NOTE ADULT - ASSESSMENT
89 y/o M with hx of dementia, CAD, GERD, anemia, HLD, HTN, depression, aspiration PNA, biba from [facility] assisted living for evaluation s/p episode of unresponsiveness this morning. As per aide from facility at bedside, pt was walking around this morning and ate his breakfast and then was sitting on the chair around 11am when he became unresponsive for ?few minutes, laid pt down on bed and oxygen given. Denies fall/head trauma. Pt unable to provide any history but denies pain. As per aide, altered mental status since becoming unresponsive. Responsive to verbal stimuli in ED.  afebrile in ED bp 165/55HR 64,  being admitted for AMS with h/o Cad HTn, Dementia , with syncope possible vasovagal, with possible UTi, cultures sent and started on ceftriaxone in ED.ECHO showes aortic stenosis and diastolic dysfunction , pt intermittently unresponsive, will hold and decrease resperdal, advanced care discussed with family and patient is DNR.  continue current care, leucocytosis resolved, patient is intermittently does not respond likely AMS with worsening dementia, Need help with all ADL and 2 person assist to ambulate,case d/w son  ID f up noted, Antibiotics changed to PO , has orophryngeal dysphagia and advised recurrent aspirations and is on mechanically soft with thin liquids  pt eval and DC plan  in progress. Family decided not to go to rehab rather go back to [facility] on home hospice

## 2018-11-24 NOTE — PROGRESS NOTE ADULT - SUBJECTIVE AND OBJECTIVE BOX
Patient is a 89y old  Male who presents with a chief complaint of syncope and AMs (24 Nov 2018 08:30)      INTERVAL HPI/OVERNIGHT EVENTS:no acute event overnight    Home Medications:  aspirin 81 mg oral tablet, chewable: 1 tab(s) orally once a day (14 Nov 2018 16:15)  Colace 100 mg oral capsule: 1 cap(s) orally once a day (14 Nov 2018 16:15)  ferrous sulfate 325 mg (65 mg elemental iron) oral tablet: 1 tab(s) orally once a day (14 Nov 2018 14:48)  Klor-Con 10 10 mEq oral tablet, extended release: orally once a day (14 Nov 2018 14:48)  Lasix 40 mg oral tablet: 1 tab(s) orally once a day (14 Nov 2018 14:48)  losartan 25 mg oral tablet: 1 tab(s) orally once a day (14 Nov 2018 14:48)  Protonix 40 mg oral granule, enteric coated: 1 each orally once a day (14 Nov 2018 14:48)  Risperdal 0.5 mg oral tablet: orally 3 times a day (14 Nov 2018 14:48)  Vitamin D3 2000 intl units oral tablet: 1 tab(s) orally once a day (14 Nov 2018 14:48)      MEDICATIONS  (STANDING):  amLODIPine   Tablet 10 milliGRAM(s) Oral daily  aspirin  chewable 81 milliGRAM(s) Oral daily  docusate sodium 100 milliGRAM(s) Oral daily  enoxaparin Injectable 30 milliGRAM(s) SubCutaneous daily  ferrous    sulfate 325 milliGRAM(s) Oral daily  lactobacillus acidophilus 1 Tablet(s) Oral three times a day with meals  losartan 100 milliGRAM(s) Oral daily  pantoprazole   Suspension 40 milliGRAM(s) Oral before breakfast  risperiDONE   Tablet 0.5 milliGRAM(s) Oral at bedtime    MEDICATIONS  (PRN):      Allergies    No Known Allergies    Intolerances        REVIEW OF SYSTEMS:  unable as patient confused  Vital Signs Last 24 Hrs  T(C): 36.9 (24 Nov 2018 09:02), Max: 36.9 (23 Nov 2018 14:10)  T(F): 98.5 (24 Nov 2018 09:02), Max: 98.5 (24 Nov 2018 09:02)  HR: 55 (24 Nov 2018 09:02) (52 - 83)  BP: 150/79 (24 Nov 2018 09:02) (150/68 - 162/69)  BP(mean): --  RR: 17 (24 Nov 2018 09:02) (17 - 18)  SpO2: 96% (24 Nov 2018 09:02) (96% - 97%)    PHYSICAL EXAM:  GENERAL: well-groomed, well-developed  HEAD:  Atraumatic, Normocephalic  EYES: EOMI, PERRLA, conjunctiva and sclera clear  ENMT: Moist mucous membranes,   NECK: Supple, No JVD, Normal thyroid  NERVOUS SYSTEM:  Alert & Oriented X1, poor memory andconcentration; Motor Strength 4/5 B/L upper and lower extremities; DTRs 2+ intact and symmetric, unstable gait  CHEST/LUNG: Clear to percussion bilaterally; No rales, rhonchi, wheezing, or rubs  HEART: Regular rate and rhythm; No murmurs, rubs, or gallops  ABDOMEN: Soft, Nontender, Nondistended; Bowel sounds present  EXTREMITIES:  2+ Peripheral Pulses, No clubbing, cyanosis, or edema  SKIN: No rashes or lesions    LABS:              CAPILLARY BLOOD GLUCOSE              I&O's Summary    23 Nov 2018 07:01  -  24 Nov 2018 07:00  --------------------------------------------------------  IN: 420 mL / OUT: 0 mL / NET: 420 mL        RADIOLOGY & ADDITIONAL TESTS:    Imaging Personally Reviewed:  [x ] YES  [ ] NO    Consultant(s) Notes Reviewed:  [x ] YES  [ ] NO    Care Discussed with Consultants/Other Providers [x ] YES  [ ] NO

## 2018-11-24 NOTE — PROGRESS NOTE ADULT - SUBJECTIVE AND OBJECTIVE BOX
NATIVIDAD LOMELI is a 89yMale , patient examined and chart reviewed.      INTERVAL HPI/ OVERNIGHT EVENTS:   Confused.  Afebrile.  Agitated.    PAST MEDICAL & SURGICAL HISTORY:  Dementia  Depression  Hyperlipidemia  Dizziness  CAD (coronary artery disease)  Hypertension  No significant past surgical history    For details regarding the patient's social history, family history, and other miscellaneous elements, please refer the initial infectious diseases consultation and/or the admitting history and physical examination for this admission.    ROS:  Unable to obtain due to : confused    Current inpatient medications :    ANTIBIOTICS/RELEVANT:    MEDICATIONS  (STANDING):  aspirin  chewable 81 milliGRAM(s) Oral daily  docusate sodium 100 milliGRAM(s) Oral daily  enoxaparin Injectable 30 milliGRAM(s) SubCutaneous daily  ferrous    sulfate 325 milliGRAM(s) Oral daily  lactobacillus acidophilus 1 Tablet(s) Oral three times a day with meals  losartan 50 milliGRAM(s) Oral daily  pantoprazole   Suspension 40 milliGRAM(s) Oral before breakfast  risperiDONE   Tablet 0.5 milliGRAM(s) Oral at bedtime      Objective:  Vital Signs Last 24 Hrs  T(C): 36.8 (24 Nov 2018 21:05), Max: 36.9 (24 Nov 2018 09:02)  T(F): 98.3 (24 Nov 2018 21:05), Max: 98.5 (24 Nov 2018 09:02)  HR: 56 (24 Nov 2018 21:05) (52 - 79)  BP: 123/56 (24 Nov 2018 21:05) (123/56 - 162/69)  RR: 18 (24 Nov 2018 21:05) (17 - 18)  SpO2: 97% (24 Nov 2018 21:05) (93% - 97%)      Physical Exam:  General: no acute distress awake confused  Eyes: sclera anicteric, pupils equal and reactive to light  ENMT: buccal mucosa moist, pharynx not injected  Neck: supple, trachea midline  Lungs: clear, no wheeze/rhonchi  Cardiovascular: regular rate and rhythm, S1 S2  Abdomen: soft, nontender, no organomegaly present, bowel sounds normal  Neurological: alert and oriented x0, Cranial Nerves II-XII grossly intact  Skin: no increased ecchymosis/petechiae/purpura  Lymph Nodes: no palpable cervical/supraclavicular lymph nodes enlargements  Extremities: no cyanosis/clubbing/edema    LABS:                                   13.1   6.58  )-----------( 209      ( 22 Nov 2018 07:41 )             38.6              MICROBIOLOGY:    Culture - Urine (collected 14 Nov 2018 21:26)  Source: .Urine Clean Catch (Midstream)  Final Report (15 Nov 2018 22:33):    10,000 - 49,000 CFU/mL Streptococcus agalactiae (Group B) isolated    Group B streptococci are susceptible to ampicillin,    penicillin and cefazolin, but may be resistant to    erythromycin and clindamycin.    Recommendations for intrapartum prophylaxis for Group B    streptococci are penicillin or ampicillin.    Culture - Blood (collected 14 Nov 2018 21:18)  Source: .Blood Blood  Preliminary Report (15 Nov 2018 22:02):    No growth to date.    Culture - Blood (collected 14 Nov 2018 21:18)  Source: .Blood Blood  Preliminary Report (15 Nov 2018 22:02):    No growth to date.    RADIOLOGY & ADDITIONAL STUDIES:  EXAM:  XR CHEST PORTABLE IMMED 1V                            PROCEDURE DATE:  11/14/2018        INTERPRETATION:    DATE OF STUDY: 11/14/18.    PRIOR: 5/29/18.    CLINICAL INDICATION: 89-yo-male - syncope; unresponsive. Assess for chest   process.      TECHNIQUE: portable chest.    FINDINGS:   The study is limited by low lung volumes.  Thoracic aortic atheromatous changes and ectasia.  The heart is magnified by technique.  No acute congestive changes.  Trace left pleural effusion seen with associated left basal atelectasis.  No right lung focal consolidation.  No pneumothorax.  There are degenerative changes of the thoracic spine.    IMPRESSION:   Trace left pleural effusion has developed since 5/23/18 exam - with   associated left basilar atelectasis.  Right lung clear.    Assessment :  89 y/o M with hx of dementia, CAD, GERD, anemia, HLD, HTN, depression, aspiration PNA, biba   from [facility] with CC of episode of unresponsiveness sec UTI  Very confused with some agitation  Overall stable      Plan :   Monitor off antibiotics  Completed course of  Amoxicillin  Asp precautions  Fall precautions  Dc planning      Continue with present regiment.  Appropriate use of antibiotics and adverse effects reviewed.      I have discussed the above plan of care with patient/ family in detail. They expressed understanding of the  treatment plan . Risks, benefits and alternatives discussed in detail. I have asked if they have any questions or concerns and appropriately addressed them to the best of my ability .    > 25 minutes were spent in direct patient care reviewing notes, medications ,labs data/ imaging , discussion with multidisciplinary team.    Thank you for allowing me to participate in care of your patient .    Andrea Lam MD  Infectious Disease  [phone]

## 2018-11-24 NOTE — PROGRESS NOTE ADULT - SUBJECTIVE AND OBJECTIVE BOX
Chief Complaint: AMS    Interval Events: No events overnight.    Review of Systems:  General: No fevers, chills, weight loss or gain  Skin: No rashes, color changes  Cardiovascular: No chest pain, orthopnea  Respiratory: No shortness of breath, cough  Gastrointestinal: No nausea, abdominal pain  Genitourinary: No incontinence, pain with urination  Musculoskeletal: No pain, swelling, decreased range of motion  Neurological: No headache, weakness  Psychiatric: No depression, anxiety  Endocrine: No weight loss or gain, increased thirst  All other systems are comprehensively negative.    Physical Exam:  Vital Signs Last 24 Hrs  T(C): 36.9 (24 Nov 2018 09:02), Max: 36.9 (23 Nov 2018 14:10)  T(F): 98.5 (24 Nov 2018 09:02), Max: 98.5 (24 Nov 2018 09:02)  HR: 55 (24 Nov 2018 09:02) (52 - 83)  BP: 150/79 (24 Nov 2018 09:02) (150/68 - 162/69)  BP(mean): --  RR: 17 (24 Nov 2018 09:02) (17 - 18)  SpO2: 96% (24 Nov 2018 09:02) (96% - 97%)  General: NAD  HEENT: MMM  Neck: No JVD, no carotid bruit  Lungs: CTAB  CV: RRR, nl S1/S2, no M/R/G  Abdomen: S/NT/ND, +BS  Extremities: No LE edema, no cyanosis  Neuro: AAOx3, non-focal  Skin: No rash    Labs:

## 2018-11-24 NOTE — PROGRESS NOTE ADULT - SUBJECTIVE AND OBJECTIVE BOX
Neurology follow up note    AGOSTINO [id number]      Interval History:    patient awake in bed     MEDICATIONS    amLODIPine   Tablet 10 milliGRAM(s) Oral daily  aspirin  chewable 81 milliGRAM(s) Oral daily  docusate sodium 100 milliGRAM(s) Oral daily  enoxaparin Injectable 30 milliGRAM(s) SubCutaneous daily  ferrous    sulfate 325 milliGRAM(s) Oral daily  lactobacillus acidophilus 1 Tablet(s) Oral three times a day with meals  losartan 100 milliGRAM(s) Oral daily  pantoprazole   Suspension 40 milliGRAM(s) Oral before breakfast  risperiDONE   Tablet 0.5 milliGRAM(s) Oral at bedtime      Allergies    No Known Allergies    Intolerances            Vital Signs Last 24 Hrs  T(C): 36.9 (24 Nov 2018 09:02), Max: 36.9 (23 Nov 2018 14:10)  T(F): 98.5 (24 Nov 2018 09:02), Max: 98.5 (24 Nov 2018 09:02)  HR: 55 (24 Nov 2018 09:02) (52 - 83)  BP: 150/79 (24 Nov 2018 09:02) (150/68 - 162/69)  BP(mean): --  RR: 17 (24 Nov 2018 09:02) (17 - 18)  SpO2: 96% (24 Nov 2018 09:02) (96% - 97%)      REVIEW OF SYSTEMS:  Limited or unable to obtain secondary to patient's poor mental status.    On Neurological Examination:    The patient is awake and alert.    Positive blink bilateral visual threat.  Pupils bilaterally 2 mm reactive to 1.    Speech, the patient would sat 1 to 2 words.  Normal baseline, he says a few words.    The patient is not following commands.    Motor:  Examination is limited.  I elevated bilateral upper extremities, was able to maintain elevated position.    Bilateral lower extremities with plantar stimulation, positive flexation at the hip and knee.   I did not notice any clear asymmetric movements.    Tone was increased bilateral upper and lower extremities.    Does not follow commands    GENERAL Exam: Nontoxic , No Acute Distress   	  HEENT:  normocephalic, atraumatic  		  LUNGS: Clear bilaterally  	  HEART: Normal S1S2   No murmur RRR        	  GI/ ABDOMEN:  Soft  Non tender    EXTREMITIES:   No Edema  No Clubbing  No Cyanosis     MUSCULOSKELETAL:  decreased Range of Motion all 4 extremities   	   SKIN: Normal  No Ecchymosis             LABS:            Hemoglobin A1C:       Vitamin B12         RADIOLOGY    ANALYSIS AND PLAN:  This is a 89-year-old with episode of unresponsiveness, history of dementia.  1.	For episode of unresponsiveness, this appears most likely a syncopal event.  There was no clear history documented to suggest underlying epilepsy and seizure attack, and there is no clear signs on examination to suggest a new cerebrovascular accident had ensued.  2.	I would recommend telemetry evaluation for underlying arrhythmia.  3.	Monitor systolic blood pressure adjust medications as needed   4.	For history of dementia secondary to the patient's age and appears to be advanced, we would recommend supportive therapy.  5.	antibiotics as needed  6.	monitor oral intake   7.	spoke to son family at [phone]  fleix in past   8.	no new events   9.	fall precautions     Thank you for the courtesy of consultation.    Physical therapy evaluation as tolerated  OOB to chair/ambulation with assistance only if possible.    Neurologic standpoint only cleared for discharge planning     Greater than 25 minutes spent in direct patient care reviewing  the notes, lab data/ imaging , discussion with multidisciplinary team.

## 2018-11-25 LAB
ANION GAP SERPL CALC-SCNC: 9 MMOL/L — SIGNIFICANT CHANGE UP (ref 5–17)
BUN SERPL-MCNC: 25 MG/DL — HIGH (ref 7–23)
CALCIUM SERPL-MCNC: 8.8 MG/DL — SIGNIFICANT CHANGE UP (ref 8.4–10.5)
CHLORIDE SERPL-SCNC: 106 MMOL/L — SIGNIFICANT CHANGE UP (ref 96–108)
CO2 SERPL-SCNC: 27 MMOL/L — SIGNIFICANT CHANGE UP (ref 22–31)
CREAT SERPL-MCNC: 1.05 MG/DL — SIGNIFICANT CHANGE UP (ref 0.5–1.3)
GLUCOSE SERPL-MCNC: 97 MG/DL — SIGNIFICANT CHANGE UP (ref 70–99)
HCT VFR BLD CALC: 40 % — SIGNIFICANT CHANGE UP (ref 39–50)
HGB BLD-MCNC: 13.5 G/DL — SIGNIFICANT CHANGE UP (ref 13–17)
MCHC RBC-ENTMCNC: 33.8 GM/DL — SIGNIFICANT CHANGE UP (ref 32–36)
MCHC RBC-ENTMCNC: 33.8 PG — SIGNIFICANT CHANGE UP (ref 27–34)
MCV RBC AUTO: 100 FL — SIGNIFICANT CHANGE UP (ref 80–100)
NRBC # BLD: 0 /100 WBCS — SIGNIFICANT CHANGE UP (ref 0–0)
PLATELET # BLD AUTO: 203 K/UL — SIGNIFICANT CHANGE UP (ref 150–400)
POTASSIUM SERPL-MCNC: 4.2 MMOL/L — SIGNIFICANT CHANGE UP (ref 3.5–5.3)
POTASSIUM SERPL-SCNC: 4.2 MMOL/L — SIGNIFICANT CHANGE UP (ref 3.5–5.3)
RBC # BLD: 4 M/UL — LOW (ref 4.2–5.8)
RBC # FLD: 13.2 % — SIGNIFICANT CHANGE UP (ref 10.3–14.5)
SODIUM SERPL-SCNC: 142 MMOL/L — SIGNIFICANT CHANGE UP (ref 135–145)
WBC # BLD: 7.56 K/UL — SIGNIFICANT CHANGE UP (ref 3.8–10.5)
WBC # FLD AUTO: 7.56 K/UL — SIGNIFICANT CHANGE UP (ref 3.8–10.5)

## 2018-11-25 RX ORDER — LOSARTAN POTASSIUM 100 MG/1
50 TABLET, FILM COATED ORAL DAILY
Qty: 0 | Refills: 0 | Status: DISCONTINUED | OUTPATIENT
Start: 2018-11-26 | End: 2018-11-26

## 2018-11-25 RX ADMIN — PANTOPRAZOLE SODIUM 40 MILLIGRAM(S): 20 TABLET, DELAYED RELEASE ORAL at 05:58

## 2018-11-25 RX ADMIN — Medication 81 MILLIGRAM(S): at 12:04

## 2018-11-25 RX ADMIN — Medication 100 MILLIGRAM(S): at 12:04

## 2018-11-25 RX ADMIN — Medication 1 TABLET(S): at 12:04

## 2018-11-25 RX ADMIN — AMLODIPINE BESYLATE 10 MILLIGRAM(S): 2.5 TABLET ORAL at 05:58

## 2018-11-25 RX ADMIN — Medication 1 TABLET(S): at 07:52

## 2018-11-25 RX ADMIN — Medication 1 TABLET(S): at 18:13

## 2018-11-25 RX ADMIN — LOSARTAN POTASSIUM 100 MILLIGRAM(S): 100 TABLET, FILM COATED ORAL at 05:58

## 2018-11-25 RX ADMIN — ENOXAPARIN SODIUM 30 MILLIGRAM(S): 100 INJECTION SUBCUTANEOUS at 12:04

## 2018-11-25 RX ADMIN — RISPERIDONE 0.5 MILLIGRAM(S): 4 TABLET ORAL at 21:31

## 2018-11-25 RX ADMIN — Medication 325 MILLIGRAM(S): at 12:04

## 2018-11-25 NOTE — PROGRESS NOTE ADULT - ASSESSMENT
The patient is a 89 year old male with a history of dementia, HTN, HL, CAD, GERD, anemia who presented yesterday after an episode of unresponsiveness.    Plan:  - Telemetry with sinus bradycardia, no significant pauses  - Avoid rate lowering agents  - Echocardiogram with grossly normal LV systolic function, no significant valve issues  - Lower losartan to 50 mg daily  - Continue amlodipine 10 mg daily  - Discharge planning

## 2018-11-25 NOTE — PROGRESS NOTE ADULT - SUBJECTIVE AND OBJECTIVE BOX
Patient is a 89y old  Male who presents with a chief complaint of syncope and AMs (24 Nov 2018 17:17)      INTERVAL HPI/OVERNIGHT EVENTS:    Home Medications:  aspirin 81 mg oral tablet, chewable: 1 tab(s) orally once a day (14 Nov 2018 16:15)  Colace 100 mg oral capsule: 1 cap(s) orally once a day (14 Nov 2018 16:15)  ferrous sulfate 325 mg (65 mg elemental iron) oral tablet: 1 tab(s) orally once a day (14 Nov 2018 14:48)  Klor-Con 10 10 mEq oral tablet, extended release: orally once a day (14 Nov 2018 14:48)  Lasix 40 mg oral tablet: 1 tab(s) orally once a day (14 Nov 2018 14:48)  losartan 25 mg oral tablet: 1 tab(s) orally once a day (14 Nov 2018 14:48)  Protonix 40 mg oral granule, enteric coated: 1 each orally once a day (14 Nov 2018 14:48)  Risperdal 0.5 mg oral tablet: orally 3 times a day (14 Nov 2018 14:48)  Vitamin D3 2000 intl units oral tablet: 1 tab(s) orally once a day (14 Nov 2018 14:48)      MEDICATIONS  (STANDING):  amLODIPine   Tablet 10 milliGRAM(s) Oral daily  aspirin  chewable 81 milliGRAM(s) Oral daily  docusate sodium 100 milliGRAM(s) Oral daily  enoxaparin Injectable 30 milliGRAM(s) SubCutaneous daily  ferrous    sulfate 325 milliGRAM(s) Oral daily  lactobacillus acidophilus 1 Tablet(s) Oral three times a day with meals  pantoprazole   Suspension 40 milliGRAM(s) Oral before breakfast  risperiDONE   Tablet 0.5 milliGRAM(s) Oral at bedtime    MEDICATIONS  (PRN):      Allergies    No Known Allergies    Intolerances        REVIEW OF SYSTEMS:  unable as confused  Vital Signs Last 24 Hrs  T(C): 36.8 (25 Nov 2018 14:10), Max: 36.8 (24 Nov 2018 21:05)  T(F): 98.2 (25 Nov 2018 14:10), Max: 98.3 (24 Nov 2018 21:05)  HR: 62 (25 Nov 2018 14:10) (50 - 79)  BP: 124/73 (25 Nov 2018 14:10) (109/53 - 136/73)  BP(mean): --  RR: 18 (25 Nov 2018 14:10) (17 - 18)  SpO2: 95% (25 Nov 2018 14:10) (93% - 98%)    PHYSICAL EXAM:  GENERAL:  well-groomed, well-developed  HEAD:  Atraumatic, Normocephalic  EYES: EOMI, PERRLA, conjunctiva and sclera clear  ENMT: Moist mucous membranes,   NECK: Supple, No JVD, Normal thyroid  NERVOUS SYSTEM:  Alert & Oriented X1, poor memory and concentration; Motor Strength 4/5 B/L upper and lower extremities; DTRs 2+ intact and symmetric  CHEST/LUNG: Clear to percussion bilaterally; No rales, rhonchi, wheezing, or rubs  HEART: Regular rate and rhythm; No murmurs, rubs, or gallops  ABDOMEN: Soft, Nontender, Nondistended; Bowel sounds present  EXTREMITIES:  2+ Peripheral Pulses, No clubbing, cyanosis, or edema  SKIN: No rashes or lesions    LABS:                        13.5   7.56  )-----------( 203      ( 25 Nov 2018 07:39 )             40.0     11-25    142  |  106  |  25<H>  ----------------------------<  97  4.2   |  27  |  1.05    Ca    8.8      25 Nov 2018 07:39          CAPILLARY BLOOD GLUCOSE              I&O's Summary    24 Nov 2018 07:01  -  25 Nov 2018 07:00  --------------------------------------------------------  IN: 200 mL / OUT: 0 mL / NET: 200 mL        RADIOLOGY & ADDITIONAL TESTS:    Imaging Personally Reviewed:  [x ] YES  [ ] NO    Consultant(s) Notes Reviewed:  [x ] YES  [ ] NO    Care Discussed with Consultants/Other Providers [ x] YES  [ ] NO

## 2018-11-25 NOTE — PROGRESS NOTE ADULT - SUBJECTIVE AND OBJECTIVE BOX
Chief Complaint: AMS    Interval Events: No events overnight.    Review of Systems:  General: No fevers, chills, weight loss or gain  Skin: No rashes, color changes  Cardiovascular: No chest pain, orthopnea  Respiratory: No shortness of breath, cough  Gastrointestinal: No nausea, abdominal pain  Genitourinary: No incontinence, pain with urination  Musculoskeletal: No pain, swelling, decreased range of motion  Neurological: No headache, weakness  Psychiatric: No depression, anxiety  Endocrine: No weight loss or gain, increased thirst  All other systems are comprehensively negative.    Physical Exam:  Vital Signs Last 24 Hrs  T(C): 36.8 (25 Nov 2018 09:17), Max: 36.8 (24 Nov 2018 21:05)  T(F): 98.2 (25 Nov 2018 09:17), Max: 98.3 (24 Nov 2018 21:05)  HR: 53 (25 Nov 2018 09:17) (50 - 79)  BP: 116/63 (25 Nov 2018 09:17) (109/53 - 154/69)  BP(mean): --  RR: 17 (25 Nov 2018 09:17) (17 - 18)  SpO2: 98% (25 Nov 2018 09:17) (93% - 98%)  General: NAD  HEENT: MMM  Neck: No JVD, no carotid bruit  Lungs: CTAB  CV: RRR, nl S1/S2, no M/R/G  Abdomen: S/NT/ND, +BS  Extremities: No LE edema, no cyanosis  Neuro: AAOx3, non-focal  Skin: No rash    Labs:             11-25    142  |  106  |  25<H>  ----------------------------<  97  4.2   |  27  |  1.05    Ca    8.8      25 Nov 2018 07:39                          13.5   7.56  )-----------( 203      ( 25 Nov 2018 07:39 )             40.0

## 2018-11-25 NOTE — PROGRESS NOTE ADULT - ASSESSMENT
89 y/o M with hx of dementia, CAD, GERD, anemia, HLD, HTN, depression, aspiration PNA, biba from [facility] assisted living for evaluation s/p episode of unresponsiveness this morning. As per aide from facility at bedside, pt was walking around this morning and ate his breakfast and then was sitting on the chair around 11am when he became unresponsive for ?few minutes, laid pt down on bed and oxygen given. Denies fall/head trauma. Pt unable to provide any history but denies pain. As per aide, altered mental status since becoming unresponsive. Responsive to verbal stimuli in ED.  afebrile in ED bp 165/55HR 64,  being admitted for AMS with h/o Cad HTn, Dementia , with syncope possible vasovagal, with possible UTi, cultures sent and started on ceftriaxone in ED.ECHO showes aortic stenosis and diastolic dysfunction , pt intermittently unresponsive, will hold and decrease resperdal, advanced care discussed with family and patient is DNR.  continue current care, leucocytosis resolved, patient is intermittently does not respond likely AMS with worsening dementia, Need help with all ADL and 2 person assist to ambulate,case d/w son  ID f up noted, Antibiotics changed to PO , has orophryngeal dysphagia and advised recurrent aspirations and is on mechanically soft with thin liquids  pt eval and DC plan  in progress. Family decided not to go to rehab rather go back to [facility] on home hospice

## 2018-11-26 ENCOUNTER — TRANSCRIPTION ENCOUNTER (OUTPATIENT)
Age: 83
End: 2018-11-26

## 2018-11-26 VITALS
RESPIRATION RATE: 18 BRPM | OXYGEN SATURATION: 97 % | HEART RATE: 71 BPM | SYSTOLIC BLOOD PRESSURE: 177 MMHG | DIASTOLIC BLOOD PRESSURE: 71 MMHG

## 2018-11-26 DIAGNOSIS — R13.12 DYSPHAGIA, OROPHARYNGEAL PHASE: ICD-10-CM

## 2018-11-26 PROCEDURE — 97530 THERAPEUTIC ACTIVITIES: CPT

## 2018-11-26 PROCEDURE — 84484 ASSAY OF TROPONIN QUANT: CPT

## 2018-11-26 PROCEDURE — 87086 URINE CULTURE/COLONY COUNT: CPT

## 2018-11-26 PROCEDURE — 99285 EMERGENCY DEPT VISIT HI MDM: CPT | Mod: 25

## 2018-11-26 PROCEDURE — 97116 GAIT TRAINING THERAPY: CPT

## 2018-11-26 PROCEDURE — 83735 ASSAY OF MAGNESIUM: CPT

## 2018-11-26 PROCEDURE — 71045 X-RAY EXAM CHEST 1 VIEW: CPT

## 2018-11-26 PROCEDURE — 93306 TTE W/DOPPLER COMPLETE: CPT

## 2018-11-26 PROCEDURE — 87640 STAPH A DNA AMP PROBE: CPT

## 2018-11-26 PROCEDURE — 87641 MR-STAPH DNA AMP PROBE: CPT

## 2018-11-26 PROCEDURE — 84443 ASSAY THYROID STIM HORMONE: CPT

## 2018-11-26 PROCEDURE — 36415 COLL VENOUS BLD VENIPUNCTURE: CPT

## 2018-11-26 PROCEDURE — 85610 PROTHROMBIN TIME: CPT

## 2018-11-26 PROCEDURE — 96360 HYDRATION IV INFUSION INIT: CPT

## 2018-11-26 PROCEDURE — 81001 URINALYSIS AUTO W/SCOPE: CPT

## 2018-11-26 PROCEDURE — 93005 ELECTROCARDIOGRAM TRACING: CPT

## 2018-11-26 PROCEDURE — 85027 COMPLETE CBC AUTOMATED: CPT

## 2018-11-26 PROCEDURE — 83880 ASSAY OF NATRIURETIC PEPTIDE: CPT

## 2018-11-26 PROCEDURE — 97110 THERAPEUTIC EXERCISES: CPT

## 2018-11-26 PROCEDURE — 87040 BLOOD CULTURE FOR BACTERIA: CPT

## 2018-11-26 PROCEDURE — 80048 BASIC METABOLIC PNL TOTAL CA: CPT

## 2018-11-26 PROCEDURE — 85730 THROMBOPLASTIN TIME PARTIAL: CPT

## 2018-11-26 PROCEDURE — 96361 HYDRATE IV INFUSION ADD-ON: CPT

## 2018-11-26 PROCEDURE — 83605 ASSAY OF LACTIC ACID: CPT

## 2018-11-26 PROCEDURE — 70450 CT HEAD/BRAIN W/O DYE: CPT

## 2018-11-26 PROCEDURE — 80061 LIPID PANEL: CPT

## 2018-11-26 PROCEDURE — 97162 PT EVAL MOD COMPLEX 30 MIN: CPT

## 2018-11-26 PROCEDURE — 80053 COMPREHEN METABOLIC PANEL: CPT

## 2018-11-26 RX ORDER — FERROUS SULFATE 325(65) MG
1 TABLET ORAL
Qty: 0 | Refills: 0 | COMMUNITY

## 2018-11-26 RX ORDER — CHOLECALCIFEROL (VITAMIN D3) 125 MCG
1 CAPSULE ORAL
Qty: 30 | Refills: 0 | OUTPATIENT
Start: 2018-11-26 | End: 2018-12-25

## 2018-11-26 RX ORDER — RISPERIDONE 4 MG/1
0 TABLET ORAL
Qty: 0 | Refills: 0 | COMMUNITY

## 2018-11-26 RX ORDER — DOCUSATE SODIUM 100 MG
1 CAPSULE ORAL
Qty: 30 | Refills: 0 | OUTPATIENT
Start: 2018-11-26 | End: 2018-12-25

## 2018-11-26 RX ORDER — AMLODIPINE BESYLATE 2.5 MG/1
1 TABLET ORAL
Qty: 30 | Refills: 0 | OUTPATIENT
Start: 2018-11-26 | End: 2018-12-25

## 2018-11-26 RX ORDER — CHOLECALCIFEROL (VITAMIN D3) 125 MCG
1 CAPSULE ORAL
Qty: 0 | Refills: 0 | COMMUNITY

## 2018-11-26 RX ORDER — PANTOPRAZOLE SODIUM 20 MG/1
1 TABLET, DELAYED RELEASE ORAL
Qty: 1 | Refills: 0 | OUTPATIENT
Start: 2018-11-26 | End: 2018-12-25

## 2018-11-26 RX ORDER — ASPIRIN/CALCIUM CARB/MAGNESIUM 324 MG
1 TABLET ORAL
Qty: 0 | Refills: 0 | COMMUNITY

## 2018-11-26 RX ORDER — DOCUSATE SODIUM 100 MG
1 CAPSULE ORAL
Qty: 0 | Refills: 0 | COMMUNITY

## 2018-11-26 RX ORDER — LOSARTAN POTASSIUM 100 MG/1
1 TABLET, FILM COATED ORAL
Qty: 30 | Refills: 0 | OUTPATIENT
Start: 2018-11-26 | End: 2018-12-25

## 2018-11-26 RX ORDER — RISPERIDONE 4 MG/1
1 TABLET ORAL
Qty: 30 | Refills: 0 | OUTPATIENT
Start: 2018-11-26 | End: 2018-12-25

## 2018-11-26 RX ORDER — FERROUS SULFATE 325(65) MG
1 TABLET ORAL
Qty: 30 | Refills: 0 | OUTPATIENT
Start: 2018-11-26 | End: 2018-12-25

## 2018-11-26 RX ORDER — ASPIRIN/CALCIUM CARB/MAGNESIUM 324 MG
1 TABLET ORAL
Qty: 30 | Refills: 0 | OUTPATIENT
Start: 2018-11-26 | End: 2018-12-25

## 2018-11-26 RX ORDER — POTASSIUM CHLORIDE 20 MEQ
0 PACKET (EA) ORAL
Qty: 0 | Refills: 0 | COMMUNITY

## 2018-11-26 RX ADMIN — Medication 100 MILLIGRAM(S): at 12:03

## 2018-11-26 RX ADMIN — ENOXAPARIN SODIUM 30 MILLIGRAM(S): 100 INJECTION SUBCUTANEOUS at 12:03

## 2018-11-26 RX ADMIN — AMLODIPINE BESYLATE 10 MILLIGRAM(S): 2.5 TABLET ORAL at 05:37

## 2018-11-26 RX ADMIN — Medication 1 TABLET(S): at 08:34

## 2018-11-26 RX ADMIN — Medication 81 MILLIGRAM(S): at 12:02

## 2018-11-26 RX ADMIN — LOSARTAN POTASSIUM 50 MILLIGRAM(S): 100 TABLET, FILM COATED ORAL at 05:37

## 2018-11-26 RX ADMIN — Medication 1 TABLET(S): at 12:02

## 2018-11-26 RX ADMIN — Medication 325 MILLIGRAM(S): at 12:02

## 2018-11-26 RX ADMIN — PANTOPRAZOLE SODIUM 40 MILLIGRAM(S): 20 TABLET, DELAYED RELEASE ORAL at 05:37

## 2018-11-26 NOTE — PROGRESS NOTE ADULT - ASSESSMENT
89 y/o M with hx of dementia, CAD, GERD, anemia, HLD, HTN, depression, aspiration PNA, biba from [facility] assisted living for evaluation s/p episode of unresponsiveness this morning. As per aide from facility at bedside, pt was walking around this morning and ate his breakfast and then was sitting on the chair around 11am when he became unresponsive for ?few minutes, laid pt down on bed and oxygen given. Denies fall/head trauma. Pt unable to provide any history but denies pain. As per aide, altered mental status since becoming unresponsive. Responsive to verbal stimuli in ED.  afebrile in ED bp 165/55HR 64,  being admitted for AMS with h/o Cad HTn, Dementia , with syncope possible vasovagal, with possible UTi, cultures sent and started on ceftriaxone in ED.ECHO showes aortic stenosis and diastolic dysfunction , pt intermittently unresponsive, will hold and decrease resperdal, advanced care discussed with family and patient is DNR.  continue current care, leucocytosis resolved, patient is intermittently does not respond likely AMS with worsening dementia, Need help with all ADL and 2 person assist to ambulate,case d/w son  ID f up noted, Antibiotics changed to PO , has orophryngeal dysphagia and advised recurrent aspirations and is on mechanically soft with thin liquids  pt eval and DC plan  in progress. Family decided not to go to rehab rather go back to [facility] on home hospice initially, and now decided for [facility] with hospice, hospice eval called, will cont current care

## 2018-11-26 NOTE — PROGRESS NOTE ADULT - SUBJECTIVE AND OBJECTIVE BOX
Patient is a 89y old  Male who presents with a chief complaint of syncope and AMs (26 Nov 2018 09:23)      INTERVAL HPI/OVERNIGHT EVENTS:no ac event overnight    Home Medications:  aspirin 81 mg oral tablet, chewable: 1 tab(s) orally once a day (14 Nov 2018 16:15)  Colace 100 mg oral capsule: 1 cap(s) orally once a day (14 Nov 2018 16:15)  ferrous sulfate 325 mg (65 mg elemental iron) oral tablet: 1 tab(s) orally once a day (14 Nov 2018 14:48)  Klor-Con 10 10 mEq oral tablet, extended release: orally once a day (14 Nov 2018 14:48)  Lasix 40 mg oral tablet: 1 tab(s) orally once a day (14 Nov 2018 14:48)  losartan 25 mg oral tablet: 1 tab(s) orally once a day (14 Nov 2018 14:48)  Protonix 40 mg oral granule, enteric coated: 1 each orally once a day (14 Nov 2018 14:48)  Risperdal 0.5 mg oral tablet: orally 3 times a day (14 Nov 2018 14:48)  Vitamin D3 2000 intl units oral tablet: 1 tab(s) orally once a day (14 Nov 2018 14:48)      MEDICATIONS  (STANDING):  amLODIPine   Tablet 10 milliGRAM(s) Oral daily  aspirin  chewable 81 milliGRAM(s) Oral daily  docusate sodium 100 milliGRAM(s) Oral daily  enoxaparin Injectable 30 milliGRAM(s) SubCutaneous daily  ferrous    sulfate 325 milliGRAM(s) Oral daily  lactobacillus acidophilus 1 Tablet(s) Oral three times a day with meals  losartan 50 milliGRAM(s) Oral daily  pantoprazole   Suspension 40 milliGRAM(s) Oral before breakfast  risperiDONE   Tablet 0.5 milliGRAM(s) Oral at bedtime    MEDICATIONS  (PRN):      Allergies    No Known Allergies    Intolerances        REVIEW OF SYSTEMS:  unable as pt confused  Vital Signs Last 24 Hrs  T(C): 36.6 (26 Nov 2018 05:08), Max: 36.8 (25 Nov 2018 14:10)  T(F): 97.8 (26 Nov 2018 05:08), Max: 98.2 (25 Nov 2018 14:10)  HR: 50 (26 Nov 2018 05:08) (50 - 75)  BP: 168/78 (26 Nov 2018 05:08) (124/73 - 172/78)  BP(mean): --  RR: 18 (26 Nov 2018 05:08) (18 - 18)  SpO2: 97% (26 Nov 2018 05:08) (95% - 97%)    PHYSICAL EXAM:  GENERAL: well-groomed, well-developed  HEAD:  Atraumatic, Normocephalic  EYES: EOMI, PERRLA, conjunctiva and sclera clear  ENMT: Moist mucous membranes,   NECK: Supple, No JVD, Normal thyroid  NERVOUS SYSTEM:  Alert & Oriented X1, poor memory and concentration; Motor Strength 4/5 B/L upper and lower extremities; DTRs 2+ intact and symmetric, unstable gait  CHEST/LUNG: Clear to percussion bilaterally; No rales, rhonchi, wheezing, or rubs  HEART: Regular rate and rhythm; No murmurs, rubs, or gallops  ABDOMEN: Soft, Nontender, Nondistended; Bowel sounds present  EXTREMITIES:  2+ Peripheral Pulses, No clubbing, cyanosis, or edema  SKIN: No rashes or lesions    LABS:                        13.5   7.56  )-----------( 203      ( 25 Nov 2018 07:39 )             40.0     11-25    142  |  106  |  25<H>  ----------------------------<  97  4.2   |  27  |  1.05    Ca    8.8      25 Nov 2018 07:39          CAPILLARY BLOOD GLUCOSE              I&O's Summary    25 Nov 2018 07:01  -  26 Nov 2018 07:00  --------------------------------------------------------  IN: 160 mL / OUT: 0 mL / NET: 160 mL        RADIOLOGY & ADDITIONAL TESTS:    Imaging Personally Reviewed:  [x ] YES  [ ] NO    Consultant(s) Notes Reviewed:  [x ] YES  [ ] NO    Care Discussed with Consultants/Other Providers [x ] YES  [ ] NO

## 2018-11-26 NOTE — PROGRESS NOTE ADULT - PROBLEM SELECTOR PROBLEM 2
Syncope, unspecified syncope type

## 2018-11-26 NOTE — PROGRESS NOTE ADULT - SUBJECTIVE AND OBJECTIVE BOX
Neurology follow up note    AGOSTINO [id number]      Interval History:    patient awake in bed     MEDICATIONS    amLODIPine   Tablet 10 milliGRAM(s) Oral daily  aspirin  chewable 81 milliGRAM(s) Oral daily  docusate sodium 100 milliGRAM(s) Oral daily  enoxaparin Injectable 30 milliGRAM(s) SubCutaneous daily  ferrous    sulfate 325 milliGRAM(s) Oral daily  lactobacillus acidophilus 1 Tablet(s) Oral three times a day with meals  losartan 50 milliGRAM(s) Oral daily  pantoprazole   Suspension 40 milliGRAM(s) Oral before breakfast  risperiDONE   Tablet 0.5 milliGRAM(s) Oral at bedtime      Allergies    No Known Allergies    Intolerances            Vital Signs Last 24 Hrs  T(C): 36.6 (26 Nov 2018 05:08), Max: 36.8 (25 Nov 2018 14:10)  T(F): 97.8 (26 Nov 2018 05:08), Max: 98.2 (25 Nov 2018 14:10)  HR: 50 (26 Nov 2018 05:08) (50 - 75)  BP: 168/78 (26 Nov 2018 05:08) (124/73 - 172/78)  BP(mean): --  RR: 18 (26 Nov 2018 05:08) (18 - 18)  SpO2: 97% (26 Nov 2018 05:08) (95% - 97%)      REVIEW OF SYSTEMS:  Limited or unable to obtain secondary to patient's poor mental status.    On Neurological Examination:    The patient is awake and alert.    Positive blink bilateral visual threat.  Pupils bilaterally 2 mm reactive to 1.    Speech, the patient would sat 1 to 2 words.  Normal baseline, he says a few words.    The patient is not following commands.    Motor:  Examination is limited.  I elevated bilateral upper extremities, was able to maintain elevated position.    Bilateral lower extremities with plantar stimulation, positive flexation at the hip and knee.   I did not notice any clear asymmetric movements.    Tone was increased bilateral upper and lower extremities.    Does not follow commands    GENERAL Exam: Nontoxic , No Acute Distress   	  HEENT:  normocephalic, atraumatic  		  LUNGS: Clear bilaterally  	  HEART: Normal S1S2   No murmur RRR        	  GI/ ABDOMEN:  Soft  Non tender    EXTREMITIES:   No Edema  No Clubbing  No Cyanosis     MUSCULOSKELETAL:  decreased Range of Motion all 4 extremities   	   SKIN: Normal  No Ecchymosis                LABS:  CBC Full  -  ( 25 Nov 2018 07:39 )  WBC Count : 7.56 K/uL  Hemoglobin : 13.5 g/dL  Hematocrit : 40.0 %  Platelet Count - Automated : 203 K/uL  Mean Cell Volume : 100.0 fl  Mean Cell Hemoglobin : 33.8 pg  Mean Cell Hemoglobin Concentration : 33.8 gm/dL  Auto Neutrophil # : x  Auto Lymphocyte # : x  Auto Monocyte # : x  Auto Eosinophil # : x  Auto Basophil # : x  Auto Neutrophil % : x  Auto Lymphocyte % : x  Auto Monocyte % : x  Auto Eosinophil % : x  Auto Basophil % : x      11-25    142  |  106  |  25<H>  ----------------------------<  97  4.2   |  27  |  1.05    Ca    8.8      25 Nov 2018 07:39      Hemoglobin A1C:       Vitamin B12         RADIOLOGY    ANALYSIS AND PLAN:  This is a 89-year-old with episode of unresponsiveness, history of dementia.  1.	For episode of unresponsiveness, this appears most likely a syncopal event.  There was no clear history documented to suggest underlying epilepsy and seizure attack, and there is no clear signs on examination to suggest a new cerebrovascular accident had ensued.  2.	I would recommend telemetry evaluation for underlying arrhythmia.  3.	Monitor systolic blood pressure adjust medications as needed   4.	For history of dementia secondary to the patient's age and appears to be advanced, we would recommend supportive therapy.  5.	S/P antibiotics  6.	monitor oral intake   7.	spoke to son family at [phone]  fleix in past   8.	no new events   9.	fall precautions   10.	overall neurologic wise no change     Thank you for the courtesy of consultation.    Physical therapy evaluation as tolerated  OOB to chair/ambulation with assistance only if possible.    Neurologic standpoint only cleared for discharge planning     Greater than 23 minutes spent in direct patient care reviewing  the notes, lab data/ imaging , discussion with multidisciplinary team.

## 2018-11-26 NOTE — PROGRESS NOTE ADULT - SUBJECTIVE AND OBJECTIVE BOX
Chief Complaint: AMS    Interval Events: No events overnight.    Review of Systems:  General: No fevers, chills, weight loss or gain  Skin: No rashes, color changes  Cardiovascular: No chest pain, orthopnea  Respiratory: No shortness of breath, cough  Gastrointestinal: No nausea, abdominal pain  Genitourinary: No incontinence, pain with urination  Musculoskeletal: No pain, swelling, decreased range of motion  Neurological: No headache, weakness  Psychiatric: No depression, anxiety  Endocrine: No weight loss or gain, increased thirst  All other systems are comprehensively negative.    Physical Exam:  Vital Signs Last 24 Hrs  T(C): 36.6 (26 Nov 2018 05:08), Max: 36.8 (25 Nov 2018 14:10)  T(F): 97.8 (26 Nov 2018 05:08), Max: 98.2 (25 Nov 2018 14:10)  HR: 50 (26 Nov 2018 05:08) (50 - 75)  BP: 168/78 (26 Nov 2018 05:08) (124/73 - 172/78)  BP(mean): --  RR: 18 (26 Nov 2018 05:08) (18 - 18)  SpO2: 97% (26 Nov 2018 05:08) (95% - 97%)  General: NAD  HEENT: MMM  Neck: No JVD, no carotid bruit  Lungs: CTAB  CV: RRR, nl S1/S2, no M/R/G  Abdomen: S/NT/ND, +BS  Extremities: No LE edema, no cyanosis  Neuro: AAOx3, non-focal  Skin: No rash    Labs:             11-25    142  |  106  |  25<H>  ----------------------------<  97  4.2   |  27  |  1.05    Ca    8.8      25 Nov 2018 07:39                          13.5   7.56  )-----------( 203      ( 25 Nov 2018 07:39 )             40.0

## 2018-11-26 NOTE — PROGRESS NOTE ADULT - ATTENDING COMMENTS
45 minutes spent on this visit, 50% visit time spent in care co-ordination with other attendings and counselling patient  I have discussed care plan with patient and HCP,expressed understanding of problems treatment and their effect and side effects, alternatives in detail,I have asked if they have any questions and concerns and appropriately addressed them to best of my ability  Reviewed all diagonostic tests, lab results and drug drug interactions, and medications

## 2018-11-26 NOTE — PROGRESS NOTE ADULT - REASON FOR ADMISSION
syncope and AMs

## 2018-11-26 NOTE — PROGRESS NOTE ADULT - PROBLEM SELECTOR PROBLEM 3
Urinary tract infection without hematuria, site unspecified

## 2018-11-26 NOTE — DISCHARGE NOTE ADULT - MEDICATION SUMMARY - MEDICATIONS TO TAKE
I will START or STAY ON the medications listed below when I get home from the hospital:    aspirin 81 mg oral tablet, chewable  -- 1 tab(s) by mouth once a day  -- Indication: For Ascvd    losartan 25 mg oral tablet  -- 1 tab(s) by mouth once a day  -- Indication: For htn    risperiDONE 0.5 mg oral tablet  -- 1 tab(s) by mouth once a day (at bedtime)  -- Indication: For dementia    amLODIPine 10 mg oral tablet  -- 1 tab(s) by mouth once a day  -- Indication: For htn    ferrous sulfate 325 mg (65 mg elemental iron) oral tablet  -- 1 tab(s) by mouth once a day  -- Indication: For Anemia    Colace 100 mg oral capsule  -- 1 cap(s) by mouth once a day  -- Indication: For Constipation    Protonix 40 mg oral granule, enteric coated  -- 1 each by mouth once a day  -- Indication: For gerd    Vitamin D3 2000 intl units oral tablet  -- 1 tab(s) by mouth once a day  -- Indication: For Suppl

## 2018-11-26 NOTE — PROGRESS NOTE ADULT - PROBLEM SELECTOR PLAN 4
cont cardiac meds

## 2018-11-26 NOTE — DISCHARGE NOTE ADULT - PATIENT PORTAL LINK FT
You can access the [facility] Patient Portal, offered by [facility], by registering with the following website: [email or web address]

## 2018-11-26 NOTE — PROGRESS NOTE ADULT - ASSESSMENT
The patient is a 89 year old male with a history of dementia, HTN, HL, CAD, GERD, anemia who presented yesterday after an episode of unresponsiveness.    Plan:  - Telemetry with sinus bradycardia, no significant pauses  - Avoid rate lowering agents  - Echocardiogram with grossly normal LV systolic function, no significant valve issues  - Continue losartan 50 mg daily  - Continue amlodipine 10 mg daily  - Discharge planning

## 2018-11-26 NOTE — PROGRESS NOTE ADULT - PROBLEM SELECTOR PLAN 2
cardiac monitoring, cardio consult, hydration , will hold lasix
cardiac monitoring, cardio consult, hydration , will hold lasix, improved
cardiac monitoring, cardio consult, hydration , will hold lasix

## 2018-11-26 NOTE — PROGRESS NOTE ADULT - PROBLEM SELECTOR PROBLEM 4
Coronary artery disease involving native coronary artery of native heart without angina pectoris

## 2018-11-26 NOTE — DISCHARGE NOTE ADULT - PLAN OF CARE
resolved possible vasovagal hydrated and empirically treated for uti supportive care mechanically soft with thin liquids cont cardiac meds losartan, amlodipine protonix

## 2018-11-26 NOTE — PROGRESS NOTE ADULT - PROBLEM SELECTOR PLAN 5
supportive care,

## 2018-11-26 NOTE — PROGRESS NOTE ADULT - PROVIDER SPECIALTY LIST ADULT
Cardiology
Infectious Disease
Internal Medicine
Neurology
Infectious Disease
Internal Medicine
Neurology

## 2018-11-26 NOTE — PROGRESS NOTE ADULT - PROBLEM SELECTOR PROBLEM 1
Altered mental status, unspecified altered mental status type

## 2018-11-26 NOTE — DISCHARGE NOTE ADULT - HOSPITAL COURSE
89 y/o M with hx of dementia, CAD, GERD, anemia, HLD, HTN, depression, aspiration PNA, biba from [facility] assisted living for evaluation s/p episode of unresponsiveness this morning. As per aide from facility at bedside, pt was walking around this morning and ate his breakfast and then was sitting on the chair around 11am when he became unresponsive for ?few minutes, laid pt down on bed and oxygen given. Denies fall/head trauma. Pt unable to provide any history but denies pain. As per aide, altered mental status since becoming unresponsive. Responsive to verbal stimuli in ED.  afebrile in ED bp 165/55HR 64,  being admitted for AMS with h/o Cad HTn, Dementia , with syncope possible vasovagal, with possible UTi, cultures sent and started on ceftriaxone in ED.ECHO showes aortic stenosis and diastolic dysfunction , pt intermittently unresponsive, will hold and decrease resperdal, advanced care discussed with family and patient is DNR.  continue current care, leucocytosis resolved, patient is intermittently does not respond likely AMS with worsening dementia, Need help with all ADL and 2 person assist to ambulate with ambulatory dysfunction,deconditioned,,case d/w son  ID f up noted, Antibiotics changed to PO , has orophryngeal dysphagia and advised recurrent aspirations and is on mechanically soft with thin liquids  pt eval and DC plan  in progress. Family decided not to go to rehab rather go back to assisted on home hospice initially, and now decided for [facility] with hospice, hospice eval called, will cont current care

## 2018-11-26 NOTE — DISCHARGE NOTE ADULT - SECONDARY DIAGNOSIS.
Alzheimer's dementia with behavioral disturbance, unspecified timing of dementia onset Oropharyngeal dysphagia Coronary artery disease involving native coronary artery of native heart without angina pectoris Essential hypertension Gastroesophageal reflux disease without esophagitis

## 2018-11-26 NOTE — DISCHARGE NOTE ADULT - CARE PLAN
Principal Discharge DX:	Syncope, unspecified syncope type  Goal:	resolved  Assessment and plan of treatment:	possible vasovagal hydrated and empirically treated for uti  Secondary Diagnosis:	Alzheimer's dementia with behavioral disturbance, unspecified timing of dementia onset  Assessment and plan of treatment:	supportive care  Secondary Diagnosis:	Oropharyngeal dysphagia  Assessment and plan of treatment:	mechanically soft with thin liquids  Secondary Diagnosis:	Coronary artery disease involving native coronary artery of native heart without angina pectoris  Assessment and plan of treatment:	cont cardiac meds  Secondary Diagnosis:	Essential hypertension  Assessment and plan of treatment:	losartan, amlodipine  Secondary Diagnosis:	Gastroesophageal reflux disease without esophagitis  Assessment and plan of treatment:	protonix

## 2018-11-26 NOTE — PROGRESS NOTE ADULT - SUBJECTIVE AND OBJECTIVE BOX
NATIVIDAD LOMELI is a 89yMale , patient examined and chart reviewed.      INTERVAL HPI/ OVERNIGHT EVENTS:   Confused.  Afebrile.  No events.    PAST MEDICAL & SURGICAL HISTORY:  Dementia  Depression  Hyperlipidemia  Dizziness  CAD (coronary artery disease)  Hypertension  No significant past surgical history    For details regarding the patient's social history, family history, and other miscellaneous elements, please refer the initial infectious diseases consultation and/or the admitting history and physical examination for this admission.    ROS:  Unable to obtain due to : confused    Current inpatient medications :    ANTIBIOTICS/RELEVANT:    MEDICATIONS  (STANDING):  aspirin  chewable 81 milliGRAM(s) Oral daily  docusate sodium 100 milliGRAM(s) Oral daily  enoxaparin Injectable 30 milliGRAM(s) SubCutaneous daily  ferrous    sulfate 325 milliGRAM(s) Oral daily  lactobacillus acidophilus 1 Tablet(s) Oral three times a day with meals  losartan 50 milliGRAM(s) Oral daily  pantoprazole   Suspension 40 milliGRAM(s) Oral before breakfast  risperiDONE   Tablet 0.5 milliGRAM(s) Oral at bedtime      Objective:  Vital Signs Last 24 Hrs  T(C): 36.6 (26 Nov 2018 05:08), Max: 36.8 (25 Nov 2018 14:10)  T(F): 97.8 (26 Nov 2018 05:08), Max: 98.2 (25 Nov 2018 14:10)  HR: 50 (26 Nov 2018 05:08) (50 - 75)  BP: 168/78 (26 Nov 2018 05:08) (124/73 - 172/78)  RR: 18 (26 Nov 2018 05:08) (18 - 18)  SpO2: 97% (26 Nov 2018 05:08) (95% - 97%)    Physical Exam:  General: no acute distress awake confused  Eyes: sclera anicteric, pupils equal and reactive to light  ENMT: buccal mucosa moist, pharynx not injected  Neck: supple, trachea midline  Lungs: clear, no wheeze/rhonchi  Cardiovascular: regular rate and rhythm, S1 S2  Abdomen: soft, nontender, no organomegaly present, bowel sounds normal  Neurological: alert and oriented x0, Cranial Nerves II-XII grossly intact  Skin: no increased ecchymosis/petechiae/purpura  Lymph Nodes: no palpable cervical/supraclavicular lymph nodes enlargements  Extremities: no cyanosis/clubbing/edema    LABS:                                   13.1   6.58  )-----------( 209      ( 22 Nov 2018 07:41 )             38.6              MICROBIOLOGY:    Culture - Urine (collected 14 Nov 2018 21:26)  Source: .Urine Clean Catch (Midstream)  Final Report (15 Nov 2018 22:33):    10,000 - 49,000 CFU/mL Streptococcus agalactiae (Group B) isolated    Group B streptococci are susceptible to ampicillin,    penicillin and cefazolin, but may be resistant to    erythromycin and clindamycin.    Recommendations for intrapartum prophylaxis for Group B    streptococci are penicillin or ampicillin.    Culture - Blood (collected 14 Nov 2018 21:18)  Source: .Blood Blood  Preliminary Report (15 Nov 2018 22:02):    No growth to date.    Culture - Blood (collected 14 Nov 2018 21:18)  Source: .Blood Blood  Preliminary Report (15 Nov 2018 22:02):    No growth to date.    Assessment :  89 y/o M with hx of dementia, CAD, GERD, anemia, HLD, HTN, depression, aspiration PNA, biba   from [facility] with CC of episode of unresponsiveness sec UTI  Very confused with some agitation better  Overall stable      Plan :   Monitor off antibiotics  Completed course of  Amoxicillin  Asp precautions  Fall precautions  Dc planning  Will sign off     Continue with present regiment.  Appropriate use of antibiotics and adverse effects reviewed.    I have discussed the above plan of care with patient/ family in detail. They expressed understanding of the  treatment plan . Risks, benefits and alternatives discussed in detail. I have asked if they have any questions or concerns and appropriately addressed them to the best of my ability .    > 25 minutes were spent in direct patient care reviewing notes, medications ,labs data/ imaging , discussion with multidisciplinary team.    Thank you for allowing me to participate in care of your patient .    Andrea Lam MD  Infectious Disease  [phone]

## 2018-11-26 NOTE — PROGRESS NOTE ADULT - PROBLEM SELECTOR PROBLEM 5
Alzheimer's dementia with behavioral disturbance, unspecified timing of dementia onset

## 2018-11-26 NOTE — DISCHARGE NOTE ADULT - MEDICATION SUMMARY - MEDICATIONS TO STOP TAKING
I will STOP taking the medications listed below when I get home from the hospital:    Lasix 40 mg oral tablet  -- 1 tab(s) by mouth once a day    Klor-Con 10 10 mEq oral tablet, extended release  -- orally once a day

## 2018-11-26 NOTE — PROGRESS NOTE ADULT - PROBLEM SELECTOR PLAN 3
ceftriaxone, f up cultures Id consult
ceftriaxone, f up cultures Id consult noted changed to po augmentin
ceftriaxone, f up cultures Id consult

## 2018-11-26 NOTE — PROGRESS NOTE ADULT - NSHPATTENDINGPLANDISCUSS_GEN_ALL_CORE
Dr everett, and dr montero,Dr Lam

## 2018-12-29 NOTE — PATIENT PROFILE ADULT - FLU SEASON?
Yes... 62Male  with PMHx  AFIB, HTN, HLD, PVD, CVA, CAD, NSTEMI s/p PCI to mid LAD, prox LCx, distal LCx, Diastolic Heart failure, DM, severe left internal carotid disease s/p CEA presenting with worsening FAYE, LE edema, orthopnea increasing over the past week. with  15 lb weight gain over the last month and +cough. dx with pna and chf    1- ANT on ckd III  2- chf  3- pneumonia   4- HTN  5-  ai fib     ant in setting of increase diuretic use along with infection/pna  may need to decrease diuretics soon  daily weight   low sodium diet  for now cont bumex and metolazone  check uric acid   d/w cards 62Male  with PMHx  AFIB, HTN, HLD, PVD, CVA, CAD, NSTEMI s/p PCI to mid LAD, prox LCx, distal LCx, Diastolic Heart failure, DM, severe left internal carotid disease s/p CEA presenting with worsening FAEY, LE edema, orthopnea increasing over the past week. with  15 lb weight gain over the last month and +cough. dx with pna and chf    1- ANT on ckd III  2- chf  3- pneumonia   4- HTN  5-  ai fib     ant in setting of increase diuretic use along with infection/pna  may need to decrease diuretics soon  daily weight   low sodium diet  for now cont bumex and metolazone  check uric acid   d/w cards  cont zithromax and rocephin

## 2019-12-20 NOTE — H&P ADULT - ASSESSMENT
The patient is seen in the pain management clinic at the request of Chandrakant Wynn MD.    ASSESSMENT:     Right knee OA  -  S/p right knee scope in 2013 and a left knee scope in 2012 for mensicus tears.   - Failed intra-articular steroid and viscosupplementation injections  - Following with Dr Wynn, TKA pending    PLAN:  1) Medical Modalities:   GFR 59    - Patient defers tramadol, discussed today  - Voltaren gel    2) Interventional Modalities:     Right knee Genicular RF to get him through until TKA discussed in detail, patient defers for now  Hold  Motrin  Voltaren gel  Do not hold  ASA    3) Behavioral Medicine Modalities:    - None    4) Other Modalities:   - PT    - Knee  brace written:  right knee(s)  The patient has laxity with stressing of the knee    5) Imaging/Labs:   - None    HISTORY OF PRESENT ILLNESS:  The patient presents with right knee pain which began around 2018.   No inciting event recalled    The pain does not radiate.      The patient denies numbness.  The patient denies tingling.  The patient reports weakness in the same distribution.  The pain is described as sharp and aching  in character.   Patient reports that the pain is worse with activity. The pain is better with sitting.    Relevant surgical history:  Right knee scope in 2013 and a left knee scope in 2012 for mensicus tears.     Mental health and substance use history:  12 glasses of wine weekly  Anxiety    Comorbid conditions:  First degree AV block  Hypertension  Obesity  GERD  Osteopenia  History of lymphoma    PAIN COURSE AND PREVIOUS INTERVENTIONS:  Medications:  Motrin  ASA    Ineffective/not tolerated:    Interventions:    Right knee cortisone Dr Wynn 10/1/19   Right knee viscosupplementation 10/28/2019    Adjuvants:  none    BLOOD THINNING MEDICATIONS:  Motrin  ASA  Voltaren gel    REVIEW OF SYSTEMS:   I agree with the ROS as documented by my staff    CURRENT MEDICATIONS:  Current Outpatient Medications   Medication Sig  Dispense Refill   • tamsulosin (FLOMAX) 0.4 MG Cap TAKE 2 CAPSULES BY MOUTH DAILY AFTER A MEAL 180 capsule 0   • fluticasone (FLONASE) 50 MCG/ACT nasal spray Spray 1 spray in each nostril daily. 16 g 12   • tamsulosin (FLOMAX) 0.4 MG Cap TAKE 2 CAPSULES BY MOUTH DAILY AFTER A MEAL 180 capsule 0   • enalapril (VASOTEC) 20 MG tablet TAKE 1 TABLET BY MOUTH DAILY 90 tablet 0   • amLODIPine (NORVASC) 5 MG tablet TAKE 1 TABLET BY MOUTH DAILY 90 tablet 0   • Lactobacillus (FLORANEX PO) Take 1 tablet by mouth daily.     • ibuprofen (MOTRIN) 200 MG tablet Take 2 tablets by mouth 2 times daily. 30 tablet 0   • Misc Natural Products (OSTEO BI-FLEX JOINT SHIELD) Tab      • ranitidine (ZANTAC) 75 MG tablet Take 1 tablet by mouth nightly.     • Multiple Vitamins-Minerals (MULTIVITAMIN PO) Take 1 tablet by mouth daily.     • Calcium Carb-Cholecalciferol (CALCIUM + D3 PO) Take 1 tablet by mouth daily.       No current facility-administered medications for this visit.        ALLERGIES:  ALLERGIES:   Allergen Reactions   • Bee Sting SWELLING       MEDICAL HISTORY:  Past Medical History:   Diagnosis Date   • Arthritis     knees bilaterally   • AV block, 1st degree    • Benign essential HTN 12/19/2014   • BPH (benign prostatic hyperplasia) 12/19/2014   • Cataracts, bilateral 2013   • DDD (degenerative disc disease), cervical 30 years ago    Treated with traction in the past/only rarely feel discomfor   • ED (erectile dysfunction) 12/19/2014    Levitra ineffective. Viagra caused headaches and sinus congestion.    • GERD (gastroesophageal reflux disease)     Well controlled with Zantac   • Hearing loss    • Hemorrhoid    • Hyperlipidemia    • Malignant lymphoma, small lymphocytic (CMS/HCC) 8/4/2015   • Multiple actinic keratoses     cryotherapy, shave biopsies   • Nuclear sclerosis of both eyes    • Obesity    • Osteopenia 3/9/2017   • Skin cancer     BCC nose bridge & back; SCC scalp, right temple   • Strabismus     right eye   •  Undescended left testicle        SURGICAL HISTORY:  Past Surgical History:   Procedure Laterality Date   • Bx/remv,lymph node,deep cerv Left 2015   • Chemosurg mohs 1st stage  1/10/14;11; 11    R temple; L scalp; scalp   • Chemosurg mohs 1st stage  2019,2019    Removal of 3 scalp squamous cell carcinomas   • Closed rx radial head/neck fx Left 2014   • Colonoscopy  10/1/2013    repeat    • Dexa bone density axial skeleton  2014   • Eye muscle surgery   3 y/o and 12 y/o    R eye strabismus repair   • Knee arthroscopy w/ partial medial meniscectomy Right 2013    Left knee 10/2/12   • Removal of tonsils,<13 y/o     • Service to gastroenterology     • Tonsillectomy and adenoidectomy     • Upper limb fx orthosis radius ulna prefab  7 y/o     Left arm   • Vasectomy     • Orange tooth extraction  21 y/o       FAMILY HISTORY:  Family History   Problem Relation Age of Onset   • Cancer Father          in his 70s from prostate ca. non-melanoma skin cancer   • * Mother          in her 80s from old age   • * Brother         70 estranged   • * Son         45 healthy   • * Son         42 healthy   • * Other         2019       SOCIAL HISTORY:  Social History     Tobacco Use   • Smoking status: Former Smoker     Packs/day: 1.50     Years: 20.00     Pack years: 30.00     Types: Cigarettes     Start date: 1962     Last attempt to quit: 1982     Years since quittin.0   • Smokeless tobacco: Never Used   • Tobacco comment: did smoke 1 1/2 pkg/d x 20 yrs   Substance Use Topics   • Alcohol use: Yes     Alcohol/week: 12.0 standard drinks     Types: 12 Glasses of wine per week     Comment: 12 per week, plans on cutting out for weight loss   • Drug use: No       PHYSICAL EXAMINATION:  See nursing documentation for vital signs    General: Alert and oriented to person  Affect:  No apparent distress  Head: Normocephalic, atraumatic  Neck: No gross asymmetry noted, no masses  noted  Eyes: Anicteric; no injection  Ears/Nose: No notable scar/lesions/masses  Respiratory: breathing comfortably    MOTOR EXAMINATION:  LOWER EXTREMITY      LEFT RIGHT   Iliopsoas 5/5 5/5   Quadriceps 5/5 5/5   Hamstrings 5/5 5/5   Foot Dorsiflexion 5/5 5/5   Extensor hallucis longus 5/5 5/5   Gastrocnemius 5/5 5/5     Right Deep tendon reflexes:   2/4 Patellar    2/4 Achilles      Left Deep tendon reflexes:    2/4 Patellar    2/4 Achilles      Sensation:   Right Lower Extremity:  Intact  Left Lower Extremity: Intact    Straight Leg Raise Test: Negative bilaterally    Pain in Right groin with hip abduction/adduction/internal rotation/external rotation Absent  Pain in Left groin with hip abduction/adduction/internal rotation/external rotation Absent    Temperature:  Normal to touch   Edema -  Absent   Skin - Normal     Right knee  Full ROM  No effusion  No redness, warmth  Good patellar tracking  No pes anserine or other bursitis  Medial joint line tenderness  No popliteal cyst  + Crepitus  There is laxity with stressing of the knee  No instability  Positive Albert test for medial knee pain        IMAGING:  10/1/19  knee     FINDINGS/IMPRESSION:     Severe left and moderate right medial compartment knee joint space  narrowing. Medial compartment osteophytes are present bilaterally.  Degenerative changes of the proximal tibiofibular articulations, right  greater than left. No fractures.    _____________________________________________    I reviewed the patients recent labwork as part of my decision making process.  I reviewed and summarized old medical records in assessing the patient today.  This note was routed to the referring provider: Chandrakant Ibarra MD  Pain Management     89 y/o M with hx of dementia, CAD, GERD, anemia, HLD, HTN, depression, aspiration PNA, biba from [facility] assisted living for evaluation s/p episode of unresponsiveness this morning. As per aide from facility at bedside, pt was walking around this morning and ate his breakfast and then was sitting on the chair around 11am when he became unresponsive for ?few minutes, laid pt down on bed and oxygen given. Denies fall/head trauma. Pt unable to provide any history but denies pain. As per aide, altered mental status since becoming unresponsive. Responsive to verbal stimuli in ED.  afebrile in ED bp 165/55HR 64,  being admitted for AMS with h/o Cad HTn, Dementia , with syncope possible vasovagal, with possible UTi, cultures sent and started on ceftriaxone in ED.

## 2020-02-29 NOTE — PROVIDER CONTACT NOTE (OTHER) - SITUATION
Impression: Nonexudative age-related macular degeneration, bilateral, early dry stage: H35.3131. OCT Mac: +ME inferior to fovea Plan: Start Pred QID OD Patient has an appointment to get new glasses at the [address] [facility] on 03/12 Patient saw 20/80 w/ BAT, suspect better vision with new glasses Rx Gave patient Amsler grid, patient to call or come back sooner if vision changes RTC w/ Dr. Ramiro Biggs DFE/OCT Mac
/69   Pt remains bradycardic(sinus bradycardia) on CM (rate low 40's with compensatory pause 2.43 sec x1)

## 2020-12-07 NOTE — ED PROVIDER NOTE - UNABLE TO OBTAIN
Encounter Date: 12/7/2020    This patient was evaluated in the Emergency Department during the coronavirus pandemic.     The  of Health and Human Services and Stewart Nash, Governor of the [facility], have declared a State of Public Health Emergency due to the spread of a novel coronavirus and disease (COVID-19).  There is no currently accepted treatment except conservative measures and respiratory support if appropriate.  This has lead to significant resource capacity and potential delays in care.     SCRIBE #1 NOTE: I, Armida Jones, am scribing for, and in the presence of,  Dr. Ho . I have scribed the entire note.       History     Chief Complaint   Patient presents with    Drug Overdose     Pt presents via [facility] from home after accidently taking a double dose of his prescribed medications. First dose was taken at 11pm and second dose taken at 1am tonight. Pt took 100mg metoprolol, 10mg tapazole, xarelto 20mg, protonix 40mg, lisinopril 2.5mg. Pt is awake and alert, only complaint is lightheadedness.      Patient is a 53 year-old male with a PMHx of Afib, HTN, DVT and CHF who presents to the ED via EMS for drug overdose. Pt reports accidentally taking his nightime medications twice. He states he took the first dose at 11 PM and woke up around 1 AM and took them again. In total he took 300 mg metoprolol, 20 mg tapazole, 40 mg Xarelto, 80 mg Protonix and 5 mg lisinopril. On arrival to the ED patient alert and oriented. He denies any CP, SOB, nausea, vomiting or lightheadedness at this time.     The history is provided by the patient.     Review of patient's allergies indicates:   Allergen Reactions    Contrast media Other (See Comments)     Severe chest pain    Food allergy formula [glutamine-c-quercet-selen-brom]      Allergic to green peas; Heart failure.    Iodinated contrast media Other (See Comments)     Chest pain    Peas Hives    Ibuprofen Swelling    Latex, natural rubber  Hives    Pcn [penicillins] Hives    Butisol [butabarbital] Rash     Peeling skin     Past Medical History:   Diagnosis Date    *Atrial fibrillation     Anticoagulant long-term use     Arthritis     Atrial fibrillation     Atrial fibrillation Feb 23, 2016    Bipolar disorder     CHF (congestive heart failure)     Congenital heart disease     s/p surgical intervention at 18 months of age    Deep vein thrombosis     DVT of leg (deep venous thrombosis)     left leg    History of prior ablation treatment     10/9/13    Hypertension     Obesity     Stroke     Thyroid disease     Venous stasis ulcer of lower extremity, unspecified laterality 12/14/2012    Venous ulcer      Past Surgical History:   Procedure Laterality Date    ANGIOPLASTY      CARDIAC SURGERY      open heart surgery at 18 months old    EYE SURGERY      left eye cataract/right eye glaucoma    TIMO FILTER PLACEMENT      Dr Calix [facility])    KNEE SURGERY      l and r     MULTIPLE TOOTH EXTRACTIONS      SKIN GRAFT      left leg     Family History   Problem Relation Age of Onset    Diabetes Father     Heart disease Father     Heart disease Maternal Grandmother     Diabetes Maternal Grandfather     Heart disease Maternal Grandfather     Stroke Maternal Grandfather      Social History     Tobacco Use    Smoking status: Former Smoker     Packs/day: 1.00     Years: 6.00     Pack years: 6.00     Types: Cigarettes    Smokeless tobacco: Former User    Tobacco comment: quit by age 25yrs old   Substance Use Topics    Alcohol use: Yes     Alcohol/week: 1.0 standard drinks     Types: 1 Glasses of wine per week     Frequency: Monthly or less     Comment: occasionally    Drug use: No     Review of Systems   Constitutional: Negative for chills and fever.   HENT: Negative for congestion, ear pain, rhinorrhea and sore throat.    Respiratory: Negative for cough, shortness of breath and wheezing.    Cardiovascular: Negative for  chest pain and palpitations.   Gastrointestinal: Negative for abdominal pain, diarrhea, nausea and vomiting.   Genitourinary: Negative for dysuria and hematuria.   Musculoskeletal: Negative for back pain, myalgias and neck pain.   Skin: Negative for rash.   Neurological: Negative for dizziness, weakness, light-headedness and headaches.   Psychiatric/Behavioral: Negative for confusion.       Physical Exam     Initial Vitals [12/07/20 0210]   BP Pulse Resp Temp SpO2   (!) 110/54 90 17 98.4 °F (36.9 °C) 98 %      MAP       --         Physical Exam    Nursing note and vitals reviewed.  Constitutional: He appears well-developed and well-nourished. He is not diaphoretic. No distress.   HENT:   Head: Normocephalic and atraumatic.   Mouth/Throat: Oropharynx is clear and moist.   Eyes: EOM are normal. Pupils are equal, round, and reactive to light.   Neck: No tracheal deviation present.   Cardiovascular: Normal rate, regular rhythm, normal heart sounds and intact distal pulses.   Pulmonary/Chest: Breath sounds normal. No stridor. No respiratory distress.   Abdominal: Soft. He exhibits no distension and no mass. There is no abdominal tenderness.   Musculoskeletal: Normal range of motion. No edema.   Neurological: He is alert and oriented to person, place, and time. No cranial nerve deficit or sensory deficit.   Skin: Skin is warm and dry. Capillary refill takes less than 2 seconds. No rash noted.   Psychiatric: He has a normal mood and affect. His behavior is normal. Thought content normal.         ED Course   Procedures  Labs Reviewed - No data to display  EKG Readings: (Independently Interpreted)   Initial Reading: No STEMI. Rhythm: Atrial Fibrillation. Heart Rate: 90.   AFib, no ischemic changes, no ST elevation       Imaging Results          X-Ray Chest 1 View (Final result)  Result time 12/07/20 04:11:59    Final result by Donna Savage MD (12/07/20 04:11:59)                 Impression:      Please see  "above.      Electronically signed by: Donna Savage MD  Date:    12/07/2020  Time:    04:11             Narrative:    EXAMINATION:  XR CHEST 1 VIEW    CLINICAL HISTORY:  Other chest pain    TECHNIQUE:  Single frontal view of the chest was performed.    COMPARISON:  08/22/2020    FINDINGS:  Cardiac monitoring leads overlie the chest.  There is unchanged enlargement of the cardiomediastinal silhouette.  The lungs are symmetrically expanded with diffuse increased interstitial attenuation which could possibly relate to component of vascular congestion/mild pulmonary edema in the correct clinical setting.  No definite evidence of confluent airspace consolidation or significant volume of pleural fluid.  No pneumothorax.  Visualized osseous structures are intact.                                 Medical Decision Making:   Initial Assessment:   53-year-old male presents the ER for evaluation of unintentional drug overdose First dose was at 11, 2nd dose at 11..Pt took total 200mg metoprolol, 20mg tapazole, xarelto 50mg, protonix 80mg, lisinopril 5mg.  Called poison control was advised to come to the ER for further evaluation.  Arrived in the ER, no acute distress no complaints blood pressure heart rate stable.  Will observe reassess.  Likely plan discharge.  Independently Interpreted Test(s):   I have ordered and independently interpreted X-rays - see prior notes.  I have ordered and independently interpreted EKG Reading(s) - see prior notes  Clinical Tests:   Radiological Study: Ordered and Reviewed  Medical Tests: Ordered and Reviewed                   ED Course as of Dec 07 0648   Mon Dec 07, 2020   0329 Patient started complaining of reproducible midsternal chest pain.  Reports this is been going on for "years ".  No significant change.  Will obtain EKG and chest x-ray, will observe reassess.    [SE]   0551 Resting in bed, no acute distress.  No complaints at this time, no bradycardia no hypotension patient remains stable. "  Advised patient plan to discharge home strict return precautions.  Advised to restart his Xarelto tomorrow.  And to hold his daytime blood pressure medication.  Strict return precautions were discussed.  Patient will be discharged.    [SE]      ED Course User Index  [SE] Roverto Ho MD            Clinical Impression:     ICD-10-CM ICD-9-CM   1. Accidental drug overdose, initial encounter  T50.901A 977.9     E858.9   2. Atypical chest pain  R07.89 786.59                          ED Disposition Condition    Discharge Stable        ED Prescriptions     None        Follow-up Information     Follow up With Specialties Details Why Contact Info    Garrison Roach MD Family Medicine Schedule an appointment as soon as possible for a visit  As needed [address]  [address]  [facility] [id number] [phone]                        My Scribe Attestation: I acknowledge that the documentation on this chart was provided by described on the date of service noted above and that the documentation in the chart accurately reflects work and decisions made by me alone.           Roverto Ho MD  12/07/20 0648     poor historian Dementia

## 2021-08-02 NOTE — H&P ADULT - PMH
CAD (coronary artery disease)    Dementia    Depression    Dizziness    Hyperlipidemia    Hypertension
6

## 2022-09-21 NOTE — ED PROVIDER NOTE - CPE EDP SKIN NORM
WOUNDS Patient presents to ER with chief complaints of sudden onset of epigastric pain with associated nausea and 2 episode of vomiting.

## 2022-09-26 NOTE — PROGRESS NOTE ADULT - ASSESSMENT
Physical Therapy Visit    Referred by: Devante Rodas MD; Medical Diagnosis (from order):    Diagnosis Information      Diagnosis    V45.89 (ICD-9-CM) - Z98.890 (ICD-10-CM) - S/P ACL reconstruction              Visit: 24    Visit Type: Daily Treatment Note    SUBJECTIVE                                                                                                               Patient reports that he still has a little pain with stairs - more with going down. and when he stretches his hamstrings he gets anterior knee pain. Both of his calves hurt when he stretches his legs.     Patient was 15 min late today.     OBJECTIVE                                                                                                                                    TREATMENT                                                                                                                  Therapeutic Exercise:  DOS: April 22, 2022  L ACL reconstruction; L knee meniscectomy previous to this in March 2022.    DL squats: 25# 10x5\" holds, 3 sets  Standing hamstring curl: level 2 3x10  Lateral step downs: 8\" step 2x10 L / 1x10 R    NOT DONE TODAY     Modified squat taps to 24\" box: 2x15   Supine bridge with HS curl on SB: 1x10 / 2x8  Lateral step down heel tap 6\" LLE 3x10  SL heel raise hold: 10x10\" holds  Split squats: 2x10 B  Reverse planks: 3x30\" holds  Lunge hold: 2x45\" holds L leg forward  Hinge holds: 3x45\" holds 10#  Med ball slams: 6# 2x10  Lateral step down heel tap: 3x10 L  S/L clam phase III: 15x5\" holds  PPT with heel slide: 8x B  Modified SL squat 28\" 1 min: L 16x, 18x / R 20x  Forward step up 10\" + airex: 2x12 / 1x16  DL leg press: 135# 20x / 155# 15x / 165#    LAQ 1x10, 1x18 level 5 / level 6 1x11  SL leg press: 115# 1x10, 1x9, 1x7, 1x5  Hinge holds: 20# 3x45\" holds  Split squats: 3x10    Manual Therapy:  B hip long axis distraction grade V  B TC joint distraction mobilization grade V  functional movement pattern B  hip flexion / L hip internal rotation    *Verbal consent given for mobilizations        Gait Training:  Using 1 crutch in RUE - cues for knee extension at heel strike and knee flexion during swing phases of gait.    Skilled input: verbal instruction/cues and tactile instruction/cues    Writer verbally educated and received verbal consent for hand placement, positioning of patient, and techniques to be performed today from patient for therapist position for techniques as described above and how they are pertinent to the patient's plan of care.    Home Exercise Program/Education Materials: Access Code: [email or web address]  URL: [email or web address]  Date: 06/03/2022  Prepared by: Iman Cherry    Exercises  Supine Heel Slide with Strap - 5 x daily - 7 x weekly - 15 reps  Prone Knee Flexion - 1 x daily - 7 x weekly - 3 sets - 10 reps  Sit to Stand with Armchair - 1 x daily - 7 x weekly - 3 sets - 10 reps  Seated Heel Slide - 1 x daily - 7 x weekly - 3 sets - 10 reps  Standing Terminal Knee Extension at Wall with Ball - 1 x daily - 7 x weekly - 10 reps - 10 seconds hold  Active Straight Leg Raise with Quad Set - 1 x daily - 7 x weekly - 3 sets - 10 reps  Single Leg Stance - 1 x daily - 7 x weekly - 10 reps - 5-10 seconds hold  Standing Heel Raise - 2 x daily - 7 x weekly - 2 sets - 10 reps       ASSESSMENT                                                                                                             Patient was stiff in his hips - improved after mobilizations. PT progressed goblet squats to 25# today. Patient was fatigued with lateral step downs and had more difficulty controlling movement on the L versus the R.   Patient Education:   Results of above outlined education: Verbalizes understanding and Demonstrates understanding      PLAN                                                                                                                           Suggestions for next session as  The patient is a 89 year old male with a history of dementia, HTN, HL, CAD, GERD, anemia who presented yesterday after an episode of unresponsiveness.    Plan:  - Telemetry with sinus bradycardia, no significant pauses  - Avoid rate lowering agents  - Echocardiogram with grossly normal LV systolic function, no significant valve issues  - Continue losartan 100 mg daily  - Continue amlodipine 10 mg daily  - Discharge planning indicated: Progress per plan of care      GOALS                                                                                                                           Long Term Goals: to be met by end of plan of care  1. Patient will be able to complete SL sit to stand 20x on the L with min to zero compensations and Without an increase in symptoms >1/10 in order to demonstrate his improved strength and ROM in his LLE necessary for safe stair negotation.  Status: progressing/ongoing  2. Pt will squats 50# x10 to with zero to minimal compensations without increase in symptoms >1/10 to facilitate lifting his granddaughter for childcare activities. Status: progressing/ongoing  3. Pt will ambulate 2 miles with minimal gait deviations without increase in symptoms to facilitate walking community distances. Status: met   4. Patient will be independent with progressed and modified home exercise program.  Status: met   5. Lower Extremity Functional Scale: Patient will complete form to reflect an improved raw score from 4/80 to greater than or equal to 79/80 to indicate patient reported improvement in function/disability/impairment (minimal detectable change: 9 points) and a return to prior level of function. Status: progressing/ongoing  6. Patient will be able to sit for 3 hours with knee at least 90 deg in order to demonstrate his improved ability to sit as necessary for return to work.  Status: partially met  He gest stiffness after 90 min but he can sit for 3 hours      Therapy procedure time and total treatment time can be found documented on the Time Entry flowsheet

## 2022-11-22 NOTE — ED ADULT NURSE REASSESSMENT NOTE - NS ED NURSE REASSESS COMMENT FT1
Awaiting ambulance transport back to [facility]. [Fever] : fever [Headache] : headache [Nasal Congestion] : nasal congestion [Sore Throat] : sore throat [Chest Pain] : chest pain [Cough] : cough [Congestion] : congestion [Negative] : Genitourinary [Eye Discharge] : no eye discharge [Eye Redness] : no eye redness [Itchy Eyes] : no itchy eyes [Changes in Vision] : no changes in vision [Ear Pain] : no ear pain [Nasal Discharge] : no nasal discharge [Sinus Pressure] : no sinus pressure

## 2023-03-17 NOTE — PATIENT PROFILE ADULT - FALL HARM RISK CONCLUSION
Ambulatory Care Coordination Note  3/17/2023    Patient Current Location:  Home: [address] [place] [id number]     ACM contacted the parent by telephone. Verified name and  with parent as identifiers. Provided introduction to self, and explanation of the ACM role. Challenges to be reviewed by the provider   Additional needs identified to be addressed with provider: No  none               Method of communication with provider: none. ACM: Bret Bhatt, RN    Per mom, Nyla Rosales, the patient was released from an involuntary hold after 36 hours. She has an appointment on 3/29 with Dr. Afshan Gibbons for medication management. She does not feel that the patient has adequate medication to get through her episodes. She was given Ativan 2mg injections at the hospital and now only has 0.25mg, which is not effective. Mom states that the patient had another episode last evening. She voices that she was not violent this time, but was shaking and screaming for nearly an hour. She is unable to use her legs or talk after her episode. She is feeling much better this morning. This ACM could hear the patient in the background, in the vehicle as mom was describing her symptoms. Will continue to follow. Encouraged mom to get into routine for the patient, which includes school. She does not have these episodes in school, per her mother. Will continue outreach and assist as needed. Lab Results       None                 Goals Addressed    None         No future appointments.
Fall with Harm Risk

## 2023-05-02 NOTE — ED PROVIDER NOTE - NS_ ATTENDINGSCRIBEDETAILS _ED_A_ED_FT
0
I personally performed the services described in the documentation, reviewed the documentation recorded by the scribe in my presence and it accurately and completely records my words and action.

## 2023-05-26 NOTE — PROGRESS NOTE ADULT - ASSESSMENT
89 y/o M with hx of dementia, CAD, GERD, anemia, HLD, HTN, depression, aspiration PNA, biba from [facility] assisted living for evaluation s/p episode of unresponsiveness this morning. As per aide from facility at bedside, pt was walking around this morning and ate his breakfast and then was sitting on the chair around 11am when he became unresponsive for ?few minutes, laid pt down on bed and oxygen given. Denies fall/head trauma. Pt unable to provide any history but denies pain. As per aide, altered mental status since becoming unresponsive. Responsive to verbal stimuli in ED.  afebrile in ED bp 165/55HR 64,  being admitted for AMS with h/o Cad HTn, Dementia , with syncope possible vasovagal, with possible UTi, cultures sent and started on ceftriaxone in ED.ECHO showes aortic stenosis and diastolic dysfunction , pt intermittently unresponsive, will hold and decrease resperdal, advanced care discussed with family and patient is DNR.  continue current care, leucocytosis resolved, patient is intermittently does not respond likely AMS with worsening dementia, Need help with all ADL and 2 person assist to ambulate,case d/w son  ID f up noted, Antibiotics changed to PO   pt eval and DC plan to rehab in progress. Propranolol Counseling:  I discussed with the patient the risks of propranolol including but not limited to low heart rate, low blood pressure, low blood sugar, restlessness and increased cold sensitivity. They should call the office if they experience any of these side effects.

## 2023-08-22 NOTE — DIETITIAN INITIAL EVALUATION ADULT. - PROBLEM SELECTOR PROBLEM 5
4 (moderate pain) Alzheimer's dementia with behavioral disturbance, unspecified timing of dementia onset

## 2023-09-27 NOTE — ED ADULT NURSE NOTE - NS PRO PASSIVE SMOKE EXP
Your Diagnosis is:  Vaginal discharge, tinea versicolor    Return to the Emergency department for fevers, chills, worsening vaginal discharge, vaginal pain or for any other issues or concerns.    Your new prescriptions are:  None    Additional instructions:  Please follow-up with your primary care provider.  I sent a referral to be seen.  Ideally you see them within the next week or so.  For tinea versicolor.  I recommend that you use a shampoo or lotion that has the main ingredient selenium sulfide or zinc pyrithione.      No

## 2023-10-25 NOTE — ED ADULT NURSE NOTE - TEMPLATE
Wounds Abbe Flap (Upper To Lower Lip) Text: The defect of the lower lip was assessed and measured.  Given the location and size of the defect, an Abbe flap was deemed most appropriate.  Using a sterile surgical marker, an appropriate Abbe flap was measured and drawn on the upper lip. Local anesthesia was then infiltrated.  A scalpel was then used to incise the upper lip through and through the skin, vermilion, muscle and mucosa, leaving the flap pedicled on the opposite side.  The flap was then rotated, carried over, and transferred to the lower lip defect.  The flap was then sutured into place with a three layer technique, closing the orbicularis oris muscle layer with subcutaneous buried sutures, followed by a mucosal layer and an epidermal layer.

## 2023-11-28 NOTE — ED ADULT TRIAGE NOTE - PRO INTERPRETER NEED 2
Spoke with pt. Pt scheduled to see Dr. Mancia for 11/29/23 at 8am. Pt verbalized understanding. Call ended well.    English

## 2024-09-13 NOTE — ED PROVIDER NOTE - MEDICAL DECISION MAKING DETAILS
"  [facility]    Stroke Telephone Note    I was called by William Walker on 09/13/24 regarding patient Shae Chinchilla. The patient is a 76 year old female w/ h/o ischemic stroke presents with transient b/l lower extremity weakness and slurred speech at 1645 lasting for 15 min. Back to normal now. Reportedly NIHSS 0 in ED.     Vitals  BP: (!) 141/78   Pulse: 89   Resp: 17   Temp: 100  F (37.8  C)   Weight: 63.5 kg (140 lb)    Imaging Findings  CT head: n/a  CTA head/neck: n/a    Impression  Transient b/l lower extremity weakness and slurred speech    Recommendations  CT head  CTA head and neck   MRI brain w/wo con    Please call us after CT/MRI are resulted.     My recommendations are based on the information provided over the phone by Shae Chinchilla's in-person providers. They are not intended to replace the clinical judgment of her in-person providers. I was not requested to personally see or examine the patient at this time.     The Stroke Staff is Dr. La.    Lucho Willett MD  Vascular Neurology Fellow    To page me or covering stroke neurology team member, click here: [facility]COM  Choose \"On Call\" tab at top, then select \"NEUROLOGY/ALL SITES\" from middle drop-down box, press Enter, then look for \"stroke\" or \"telestroke\" for your site.   " bleeding from varicose vein. no signs/sxs of signif blood loss. bleeding stopped c pressure. surgicel dressing and gauze placed. pmd fu

## 2024-10-28 NOTE — ED ADULT NURSE NOTE - OBJECTIVE STATEMENT
<--- Click to Launch ICDx for PreOp, PostOp and Procedure
According to HHA at bedside pt fainted today.  Pt acting his norm at this time.  Pt with dementia and is combatitive.  MD at bedside.  HHA attempting to obtain urine.

## 2024-11-13 NOTE — ED PROVIDER NOTE - NS ED MD DISPO DISCHARGE [facility]
ORTHOPEDIC SURGERY DAILY PROGRESS NOTE    ADMISSION DATE:  11/12/2024  DATE:  11/13/2024  CURRENT HOSPITAL DAY:  Hospital Day: 2    SURGEON:  Surgeons and Role:  Panel 1:     * Josh Murray MD - Primary  Panel 2:     * Robby Henderson MD - Primary   ATTENDING PHYSICIAN:  Robby Henderson MD    CODE STATUS:  Prior    POST-OP DAY:  1 Day Post-Op    INTERVAL HISTORY:    Selene Escobar is a 87 year old female patient admitted with BKA stump complication  (CMD) [T87.9]  Wound healing, delayed [T14.8XXD]  Below-knee amputation of left lower extremity, subsequent encounter  (CMD) [S88.112D]   Pt stable, NAOE.  Pt denies SOB/CP/new numbness or tingling/fever/chills.  Reports mild pain at the incision site that is well controlled with tylenol.      MEDICATIONS:    The medication list was reviewed today.       OBJECTIVE:     VITALS  Vitals with min/max:      Vital Last Value 24 Hour Range   Temperature 98.4 °F (36.9 °C) (11/13/24 0531) Temp  Min: 97.7 °F (36.5 °C)  Max: 98.6 °F (37 °C)   Pulse 72 (11/13/24 0531) Pulse  Min: 57  Max: 85   Respiratory 18 (11/13/24 0531) Resp  Min: 12  Max: 20   Non-Invasive  Blood Pressure (!) 145/66 (11/13/24 0531) BP  Min: 107/59  Max: 153/68   Pulse Oximetry 94 % (11/13/24 0531) SpO2  Min: 94 %  Max: 99 %     PHYSICAL EXAM:    Constitutional:  No acute distress.  Neurologic:  Alert and oriented.   Psych: Appropriate mood and affect  Heart:  Regular rate and rhythm by peripheral pulse.   Lungs:  Non-labored respirations on room air.   Abdomen:  Soft, non-distended.   Skin: No rash on exposed extremities.  Musculoskeletal:   SCD's on R LE   Non-tender BLE, negative karmen sign  Post operative dressing clean, dry, and intact  No erythema, swelling or drainage exposed extremities  Tender to palpation appropriate      LLE  Knee flexion and extension intact.  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 Robby Henderson and Dr. Josh Murray (11/12/24).      PLAN:    Weightbearing Status: NWB LLE x 6 weeks.   Antibiotics: Perioperative Ancef x 24 hours  Anticoagulation:  Resume previous anticoagulation. Per primary.  Analgesia:   Tylenol 650 mg scheduled every 6 hours x 2 weeks  Avoid narcotics given patient age  Bone Health: Vitamin D 4,000 IU Daily x 12 weeks postoperatively  PT/OT: NWB LLE x 6 weeks. ROM L knee as tolerated.  PT Wound Care: Start Compression wrapping protocol POD1  Medical comanagement  Continue prophylactic precautions: IS, SCDs  Diet baseline  Dispo: Discharge home pending pain control and clearance from physical therpay..  Follow up with Brad Saenz PA-C in 2 weeks for post operative evaluation.      Brad Saenz PA-C  Physician Assistant with Dr. Robby Henderson  [facility]  [employer]  Office Number: [phone]      .             Patient/Caregiver provided printed discharge information.

## 2025-02-28 NOTE — ED PROVIDER NOTE - DATE/TIME 1
S: Nursing notes reviewed, additional history as below.    Patient denies any vaginal bleeding, leaking of fluid (LOF), abnormal vaginal discharge, or regular contractions/abdominal pain. +FM. Had a cold 2 weeks ago, still has green nasal discharge and a productive cough.     O: /70 (BP Location: RUE - Right upper extremity, Patient Position: Sitting, Cuff Size: Regular)   Pulse (!) 100   LMP 2024   General: No acute distress (NAD), alert, oriented.   Abd: Soft, nontender, gravid.  Pelvic: Normal appearing external genitalia and hair distribution. Normal appearing vaginal mucosa with physiologic discharge. Normal appearing cervix.  Ext: No lower extremity edema, bilateral calves nontender.  Skin: Warm, dry, no rashes noted on visible areas.    A: 34 year old  female @35w6d, expected date of delivery (KARLA) 3/29/2025 presenting for prenatal care.     P:   - Augmentin BID x7d sent to pharmacy for sinusitis  - GBS collected    Disposition: Return in 1 week.     Jodi Rivas MD  2025      
25-Jun-2018 05:57
Headache, LUQ pain